# Patient Record
Sex: FEMALE | Race: WHITE | NOT HISPANIC OR LATINO | Employment: UNEMPLOYED
[De-identification: names, ages, dates, MRNs, and addresses within clinical notes are randomized per-mention and may not be internally consistent; named-entity substitution may affect disease eponyms.]

---
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
## 2020-08-18 LAB
ABO + RH BLD: NORMAL
ABO + RH BLD: NORMAL
BLD GP AB SCN SERPL QL: NORMAL
HCT VFR BLD AUTO: 43.1 %
HEMOGLOBIN: 14.2 G/DL (ref 11.7–15.7)
HIV 1+2 AB+HIV1 P24 AG SERPL QL IA: NORMAL
RUBELLA ANTIBODY IGG QUANTITATIVE: NORMAL IU/ML

## 2020-09-11 ENCOUNTER — TRANSFERRED RECORDS (OUTPATIENT)
Dept: HEALTH INFORMATION MANAGEMENT | Facility: CLINIC | Age: 20
End: 2020-09-11

## 2020-09-16 ENCOUNTER — TRANSFERRED RECORDS (OUTPATIENT)
Dept: HEALTH INFORMATION MANAGEMENT | Facility: CLINIC | Age: 20
End: 2020-09-16

## 2021-01-19 ENCOUNTER — PRENATAL OFFICE VISIT (OUTPATIENT)
Dept: NURSING | Facility: CLINIC | Age: 21
End: 2021-01-19
Payer: MEDICAID

## 2021-01-19 DIAGNOSIS — Z34.80 PRENATAL CARE, SUBSEQUENT PREGNANCY, UNSPECIFIED TRIMESTER: Primary | ICD-10-CM

## 2021-01-19 PROCEDURE — 99207 PR NO CHARGE NURSE ONLY: CPT

## 2021-01-19 RX ORDER — VITAMIN A ACETATE, .BETA.-CAROTENE, ASCORBIC ACID, CHOLECALCIFEROL, .ALPHA.-TOCOPHEROL ACETATE, DL-, THIAMINE MONONITRATE, RIBOFLAVIN, NIACINAMIDE, PYRIDOXINE HYDROCHLORIDE, FOLIC ACID, CYANOCOBALAMIN, CALCIUM CARBONATE, FERROUS FUMARATE, ZINC OXIDE, AND CUPRIC OXIDE 2000; 2000; 120; 400; 22; 1.84; 3; 20; 10; 1; 12; 200; 27; 25; 2 [IU]/1; [IU]/1; MG/1; [IU]/1; MG/1; MG/1; MG/1; MG/1; MG/1; MG/1; UG/1; MG/1; MG/1; MG/1; MG/1
1 TABLET ORAL DAILY
COMMUNITY
End: 2021-05-11

## 2021-01-19 NOTE — PROGRESS NOTES
NPN nurse visit done over the phone. Pt will be given NPN folder and book at her upcoming appt.   Discussed optional screening available to assess chromosomal anomalies. Questions answered. Pt advised to call the clinic if she has any questions or concerns related to her pregnancy. Prenatal labs will be obtained at her upcoming appt. New prenatal visit scheduled on 1/22/21 with Dr Hughes.    30w2d    No results found for: PAP- last pap during last preg per pt, abnml.  No record on this        Patient supplied answers from flow sheet for:  Prenatal OB Questionnaire.  Past Medical History  Diabetes?: No  Hypertension : No  Heart disease, mitral valve prolapse or rheumatic fever?: (!) Yes(bradycardia)  An autoimmune disease such as lupus or rheumatoid arthritis?: No  Kidney disease or urinary tract infection?: No  Epilepsy, seizures or spells?: No  Migraine headaches?: No  A stroke or loss of function or sensation?: No  Any other neurological problems?: No  Have you ever been treated for depression?: (!) Yes  Are you having problems with crying spells or loss of self-esteem?: (!) Yes(a little, will discuss at appt)  Have you ever required psychiatric care?: No  Have you ever had hepatitis, liver disease or jaundice?: No  Have you been treated for blood clots in your veins, deep vein thromosis, inflammation in the veins, thrombosis, phlebitis, pulmonary embolism or varicosities?: No  Have you had excessive bleeding after surgery or dental work?: No  Do you bleed more than other women after a cut or scratch?: No  Do you have a history of anemia?: No  Have you ever had thyroid problems or taken thyroid medication?: No   Do you have any endocrine problems?: No  Have you ever been in a major accident or suffered serious trauma?: No  Within the last year, has anyone hit, slapped, kicked or otherwise hurt you?: No  In the last year, has anyone forced you to have sex when you didn't want to?: No    Past Medical History 2   Have  you ever received a blood transfusion?: No  Would you refuse a blood transfusion if a doctor judged it to be medically necessary?: No   If you answered Yes, would you rather die than receive a blood transfusion?: No  If you answered Yes, is this for Restorationist reasons?: No  Does anyone in your home smoke?: No  Do you use tobacco products?: No  Do you drink beer, wine or hard liquor?: No  Do you use any of the following: marijuana, speed, cocaine, heroin, hallucinogens or other drugs?: No   Is your blood type Rh negative?: No  Have you ever had abnormal antibodies in your blood?: No  Have you ever had asthma?: (!) Yes(as a child)  Have you ever had tuberculosis?: No  Do you have any allergies to drugs or over-the-counter medications?: No  Allergies: Dust Mites, Aspartame, Ethanol, Venlafaxine, Hydrochloride, Sertraline: (!) Yes  Have you had any breast problems?: No  Have you ever ?: (!) Yes  Have you had any gynecological surgical procedures such as cervical conization, a LEEP procedure, laser treatment, cryosurgery of the cervix or a dilation and curettage, etc?: No  Have you ever had any other surgical procedures?: No  Have you been hospitalized for a nonsurgical reason excluding normal delivery?: No  Have you ever had any anesthetic complications?: No  Have you ever had an abnormal pap smear?: (!) Yes(during pregnancy)    Past Medical History (Continued)  Do you have a history of abnormalities of the uterus?: No  Did your mother take MARIS or any other hormones when she was pregnant with you?: No  Did it take you more than a year to become pregnant?: No  Have you ever been evaluated or treated for infertility?: No  Is there a history of medical problems in your family, which you feel may be important to this pregnancy?: No  Do you have any other problems we have not asked about which you feel may be important to this pregnancy?: No    Symptoms since last menstrual period  Do you have any of the following  symptoms: abdominal pain, blood in stools or urine, chest pain, shortness of breath, coughing or vomiting up blood, your heart racing or skipping beats, nausea and vomiting, pain on urination or vaginal discharge or bleed: No  Will the patient be 35 years old or older at the time of delivery?: No    Has the patient, baby's father or anyone in either family had:  Thalassemia (Italian, Greek, Mediterranean or  background only) and an MCV result less than 80?: No  Neural tube defect such as meningomyelocele, spina bifida or anencephaly?: No  Congenital heart defect?: No  Down's Syndrome?: No  José Miguel-Sachs disease (Gnosticist, Cajun, Bel[place]ian-[place])?: No  Sickle cell disease or trait ()?: No  Hemophilia or other inherited problems of blood?: No  Muscular dystrophy?: No  Cystic fibrosis?: No  Wheaton's chorea?: No  Mental retardation/autism?: No  If yes, was the person tested for fragile X?: No  Any other inherited genetic or chromosomal disorder?: No  Maternal metabolic disorder (e.g Insulin-dependent diabetes, PKU)?: No  A child with birth defects not listed above?: No  Recurrent pregnancy loss or stillbirth?: No   Has the patient had any medications/street drugs/alcohol since her last menstrual period?: No  Does the patient or baby's father have any other genetic risks?: No    Infection History   Do you object to being tested for Hepatitis B?: No  Do you object to being tested for HIV?: No   Do you feel that you are at high risk for coming in contact with the AIDS virus?: No  Have you ever been treated for tuberculosis?: No  Have you ever had a positive skin test for tuberculosis?: No  Do you live with someone who has tuberculosis?: No  Have you ever been exposed to tuberculosis?: No  Do you have genital herpes?: No  Does your partner have genital herpes?: No  Have you had a viral illness since your last period?: No  Have you ever had gonorrhea, chlamydia, syphilis, venereal warts, trichomoniasis, pelvic  inflammatory disease or any other sexually transmitted disease?: No  Do you know if you are a genital group B streptococcus carrier?: No  Have you had chicken pox/varicella?: No   Have you been vaccinated against chicken Pox?: (!) Yes  Have you had any other infectious diseases?: No

## 2021-01-20 DIAGNOSIS — Z34.80 PRENATAL CARE, SUBSEQUENT PREGNANCY, UNSPECIFIED TRIMESTER: ICD-10-CM

## 2021-01-20 LAB
ERYTHROCYTE [DISTWIDTH] IN BLOOD BY AUTOMATED COUNT: 12.6 % (ref 10–15)
GLUCOSE 1H P 50 G GLC PO SERPL-MCNC: 118 MG/DL (ref 60–129)
HBV SURFACE AG SERPL QL IA: NONREACTIVE
HCT VFR BLD AUTO: 32.5 % (ref 35–47)
HGB BLD-MCNC: 10.9 G/DL (ref 11.7–15.7)
MCH RBC QN AUTO: 31.7 PG (ref 26.5–33)
MCHC RBC AUTO-ENTMCNC: 33.5 G/DL (ref 31.5–36.5)
MCV RBC AUTO: 95 FL (ref 78–100)
PLATELET # BLD AUTO: 222 10E9/L (ref 150–450)
RBC # BLD AUTO: 3.44 10E12/L (ref 3.8–5.2)
T PALLIDUM AB SER QL: NONREACTIVE
WBC # BLD AUTO: 11.3 10E9/L (ref 4–11)

## 2021-01-20 PROCEDURE — 99000 SPECIMEN HANDLING OFFICE-LAB: CPT | Performed by: OBSTETRICS & GYNECOLOGY

## 2021-01-20 PROCEDURE — 87086 URINE CULTURE/COLONY COUNT: CPT | Performed by: OBSTETRICS & GYNECOLOGY

## 2021-01-20 PROCEDURE — 76805 OB US >/= 14 WKS SNGL FETUS: CPT | Performed by: OBSTETRICS & GYNECOLOGY

## 2021-01-20 PROCEDURE — 36415 COLL VENOUS BLD VENIPUNCTURE: CPT | Performed by: OBSTETRICS & GYNECOLOGY

## 2021-01-20 PROCEDURE — 82950 GLUCOSE TEST: CPT | Performed by: OBSTETRICS & GYNECOLOGY

## 2021-01-20 PROCEDURE — 86780 TREPONEMA PALLIDUM: CPT | Mod: 90 | Performed by: OBSTETRICS & GYNECOLOGY

## 2021-01-20 PROCEDURE — 87340 HEPATITIS B SURFACE AG IA: CPT | Performed by: OBSTETRICS & GYNECOLOGY

## 2021-01-20 PROCEDURE — 85027 COMPLETE CBC AUTOMATED: CPT | Performed by: OBSTETRICS & GYNECOLOGY

## 2021-01-21 LAB
BACTERIA SPEC CULT: NO GROWTH
Lab: NORMAL
SPECIMEN SOURCE: NORMAL

## 2021-01-22 ENCOUNTER — PRENATAL OFFICE VISIT (OUTPATIENT)
Dept: OBGYN | Facility: CLINIC | Age: 21
End: 2021-01-22
Payer: MEDICAID

## 2021-01-22 VITALS — DIASTOLIC BLOOD PRESSURE: 76 MMHG | WEIGHT: 166 LBS | SYSTOLIC BLOOD PRESSURE: 122 MMHG

## 2021-01-22 DIAGNOSIS — Z23 NEED FOR PROPHYLACTIC VACCINATION AND INOCULATION AGAINST INFLUENZA: ICD-10-CM

## 2021-01-22 DIAGNOSIS — F41.1 GAD (GENERALIZED ANXIETY DISORDER): ICD-10-CM

## 2021-01-22 DIAGNOSIS — O09.33 INSUFFICIENT PRENATAL CARE IN THIRD TRIMESTER: Primary | ICD-10-CM

## 2021-01-22 PROCEDURE — 99202 OFFICE O/P NEW SF 15 MIN: CPT | Mod: 25 | Performed by: OBSTETRICS & GYNECOLOGY

## 2021-01-22 PROCEDURE — 90715 TDAP VACCINE 7 YRS/> IM: CPT | Performed by: OBSTETRICS & GYNECOLOGY

## 2021-01-22 PROCEDURE — 90472 IMMUNIZATION ADMIN EACH ADD: CPT | Performed by: OBSTETRICS & GYNECOLOGY

## 2021-01-22 PROCEDURE — 90686 IIV4 VACC NO PRSV 0.5 ML IM: CPT | Performed by: OBSTETRICS & GYNECOLOGY

## 2021-01-22 PROCEDURE — 90471 IMMUNIZATION ADMIN: CPT | Performed by: OBSTETRICS & GYNECOLOGY

## 2021-01-22 RX ORDER — ESCITALOPRAM OXALATE 5 MG/1
5 TABLET ORAL DAILY
Qty: 90 TABLET | Refills: 3 | Status: SHIPPED | OUTPATIENT
Start: 2021-01-22 | End: 2021-02-01

## 2021-01-22 SDOH — HEALTH STABILITY: MENTAL HEALTH: HOW OFTEN DO YOU HAVE A DRINK CONTAINING ALCOHOL?: NEVER

## 2021-01-22 NOTE — PROGRESS NOTES
Pt is a [place] from [place] where she had limited prenatal care, with the last OB visit at 13+ weeks.  She then had to move to [place] and has not had OB care since.  Her OB records are at the [place] office (we're in [place] right now).  Will review those when able.  Had labs done in [place].  Also had 28 week labs done here 2 days ago - WNL.  TDaP today.  U/S done today for anatomy however report pending, and my review of images suggests several views insufficient to fully evaluate due to advanced gestational age.  Will get Level 2 U/S as precaution.  Flu shot today.  TDaP today. Fetal movement counts BID,  labor/premature rupture of membranes precautions reviewed.  RTC 2 week(s).    Encounter Diagnoses   Name Primary?     Insufficient prenatal care in third trimester Yes     JASSON (generalized anxiety disorder)        Risk factors listed above are stable and being addressed as noted.    Santy Hughes MD  [facility]

## 2021-01-22 NOTE — NURSING NOTE
"Chief Complaint   Patient presents with     Prenatal Care     30 weeks 5 days- no concerns        Initial /76 (BP Location: Right arm, Patient Position: Sitting, Cuff Size: Adult Regular)   Wt 75.3 kg (166 lb)   LMP 2020  Estimated body mass index is 21.88 kg/m  as calculated from the following:    Height as of 1/10/08: 1.403 m (4' 7.25\").    Weight as of 1/10/08: 43.1 kg (95 lb).  BP completed using cuff size: regular    Questioned patient about current smoking habits.  Pt. has never smoked.          The following HM Due: NONE    30w5d  Jacky Cota CMA                "

## 2021-01-24 ENCOUNTER — HEALTH MAINTENANCE LETTER (OUTPATIENT)
Age: 21
End: 2021-01-24

## 2021-01-25 ENCOUNTER — TRANSCRIBE ORDERS (OUTPATIENT)
Dept: MATERNAL FETAL MEDICINE | Facility: CLINIC | Age: 21
End: 2021-01-25

## 2021-01-25 ENCOUNTER — PRE VISIT (OUTPATIENT)
Dept: MATERNAL FETAL MEDICINE | Facility: CLINIC | Age: 21
End: 2021-01-25

## 2021-01-25 DIAGNOSIS — O26.90 PREGNANCY RELATED CONDITION, ANTEPARTUM: Primary | ICD-10-CM

## 2021-01-28 ENCOUNTER — OFFICE VISIT (OUTPATIENT)
Dept: MATERNAL FETAL MEDICINE | Facility: CLINIC | Age: 21
End: 2021-01-28
Attending: OBSTETRICS & GYNECOLOGY
Payer: MEDICAID

## 2021-01-28 ENCOUNTER — HOSPITAL ENCOUNTER (OUTPATIENT)
Dept: ULTRASOUND IMAGING | Facility: CLINIC | Age: 21
End: 2021-01-28
Attending: OBSTETRICS & GYNECOLOGY
Payer: MEDICAID

## 2021-01-28 DIAGNOSIS — Z36.3 ENCOUNTER FOR ROUTINE SCREENING FOR MALFORMATION USING ULTRASOUND: Primary | ICD-10-CM

## 2021-01-28 DIAGNOSIS — O26.90 PREGNANCY RELATED CONDITION, ANTEPARTUM: ICD-10-CM

## 2021-01-28 PROCEDURE — 76811 OB US DETAILED SNGL FETUS: CPT

## 2021-01-28 PROCEDURE — 76811 OB US DETAILED SNGL FETUS: CPT | Mod: 26 | Performed by: OBSTETRICS & GYNECOLOGY

## 2021-01-28 NOTE — PROGRESS NOTES
Dominique June was seen for an ultrasound today at the [facility] center.      For the details of the ultrasound please see the report which can be found under the imaging tab.      Melba Rivera MD  , OB/GYN  Maternal-Fetal Medicine  [email or web address]  [phone] (Main M Office)  [phone] or [phone] (for 24 hour MFM questions)  [phone] (Pager)

## 2021-02-01 ENCOUNTER — PRENATAL OFFICE VISIT (OUTPATIENT)
Dept: OBGYN | Facility: CLINIC | Age: 21
End: 2021-02-01
Payer: MEDICAID

## 2021-02-01 VITALS — WEIGHT: 167 LBS | SYSTOLIC BLOOD PRESSURE: 132 MMHG | DIASTOLIC BLOOD PRESSURE: 72 MMHG

## 2021-02-01 DIAGNOSIS — O09.33 INSUFFICIENT PRENATAL CARE IN THIRD TRIMESTER: Primary | ICD-10-CM

## 2021-02-01 PROCEDURE — 99212 OFFICE O/P EST SF 10 MIN: CPT | Performed by: OBSTETRICS & GYNECOLOGY

## 2021-02-01 NOTE — NURSING NOTE
"Chief Complaint   Patient presents with     Prenatal Care     32 weeks 1 day- heartburn        Initial /72 (BP Location: Right arm, Patient Position: Sitting, Cuff Size: Adult Regular)   Wt 75.8 kg (167 lb)   LMP 2020  Estimated body mass index is 21.88 kg/m  as calculated from the following:    Height as of 1/10/08: 1.403 m (4' 7.25\").    Weight as of 1/10/08: 43.1 kg (95 lb).  BP completed using cuff size: regular    Questioned patient about current smoking habits.  Pt. has never smoked.          The following HM Due: NONE    32w1d  Jacky Cota CMA                "

## 2021-02-01 NOTE — PROGRESS NOTES
No c/o's.  Start extra Fe due to COVID-related blood transfusion.  Fetal movement counts BID,  labor/premature rupture of membranes precautions reviewed.  RTC 2 week(s).    Encounter Diagnosis   Name Primary?     Insufficient prenatal care in third trimester Yes       Risk factors listed above are stable and being addressed as noted.    Santy Hughes MD  [facility] [place]

## 2021-02-15 ENCOUNTER — PRENATAL OFFICE VISIT (OUTPATIENT)
Dept: OBGYN | Facility: CLINIC | Age: 21
End: 2021-02-15
Payer: MEDICAID

## 2021-02-15 VITALS — SYSTOLIC BLOOD PRESSURE: 132 MMHG | WEIGHT: 169 LBS | DIASTOLIC BLOOD PRESSURE: 78 MMHG

## 2021-02-15 DIAGNOSIS — O09.33 INSUFFICIENT PRENATAL CARE IN THIRD TRIMESTER: Primary | ICD-10-CM

## 2021-02-15 DIAGNOSIS — B35.4 TINEA CORPORIS: ICD-10-CM

## 2021-02-15 DIAGNOSIS — N76.89 OTHER SPECIFIED INFLAMMATION OF VAGINA AND VULVA: ICD-10-CM

## 2021-02-15 LAB
SPECIMEN SOURCE: NORMAL
WET PREP SPEC: NORMAL

## 2021-02-15 PROCEDURE — 99207 PR PRENATAL VISIT: CPT | Performed by: OBSTETRICS & GYNECOLOGY

## 2021-02-15 PROCEDURE — 87210 SMEAR WET MOUNT SALINE/INK: CPT | Performed by: OBSTETRICS & GYNECOLOGY

## 2021-02-15 PROCEDURE — 99213 OFFICE O/P EST LOW 20 MIN: CPT | Performed by: OBSTETRICS & GYNECOLOGY

## 2021-02-15 PROCEDURE — 87491 CHLMYD TRACH DNA AMP PROBE: CPT | Performed by: OBSTETRICS & GYNECOLOGY

## 2021-02-15 PROCEDURE — 87591 N.GONORRHOEAE DNA AMP PROB: CPT | Performed by: OBSTETRICS & GYNECOLOGY

## 2021-02-15 RX ORDER — CLOTRIMAZOLE AND BETAMETHASONE DIPROPIONATE 10; .64 MG/G; MG/G
CREAM TOPICAL 2 TIMES DAILY
Qty: 15 G | Refills: 1 | Status: SHIPPED | OUTPATIENT
Start: 2021-02-15 | End: 2021-03-15

## 2021-02-15 RX ORDER — FLUCONAZOLE 150 MG/1
TABLET ORAL
Qty: 2 TABLET | Refills: 0 | Status: SHIPPED | OUTPATIENT
Start: 2021-02-15 | End: 2021-03-15

## 2021-02-15 NOTE — PROGRESS NOTES
Pt has right inguinal crease between perineum and inner thigh rash, itching x 3 weeks, no improvement w/ Desitin oint.  No OB c/o's.  Fetus active, no VB, SROM, UCs.  Exam- 6 x 3 cm area erythematous patch in the above noted skin fold c/w tinea, Vagina has white discharge also, Wet prep done.  GC/Chlam done.  Rx Diflucan 150 mg, Lotrisone ointment.  GBS at next visit.  Fetal movement counts BID,  labor/premature rupture of membranes precautions reviewed.  RTC 2 week(s).    Encounter Diagnoses   Name Primary?     Insufficient prenatal care in third trimester Yes     Other specified inflammation of vagina and vulva      Tinea corporis        Risk factors listed above are stable and being addressed as noted.    Santy Hughes MD  [facility] [place]

## 2021-02-15 NOTE — NURSING NOTE
"Chief Complaint   Patient presents with     Prenatal Care     34 weeks 1 day- vaginal rash        Initial /78 (BP Location: Right arm, Patient Position: Sitting, Cuff Size: Adult Large)   Wt 76.7 kg (169 lb)   LMP 2020  Estimated body mass index is 21.88 kg/m  as calculated from the following:    Height as of 1/10/08: 1.403 m (4' 7.25\").    Weight as of 1/10/08: 43.1 kg (95 lb).  BP completed using cuff size: large    Questioned patient about current smoking habits.  Pt. has never smoked.          The following HM Due: NONE    34w1d  Jacky Cota CMA                "

## 2021-02-16 LAB
C TRACH DNA SPEC QL NAA+PROBE: NEGATIVE
N GONORRHOEA DNA SPEC QL NAA+PROBE: NEGATIVE
SPECIMEN SOURCE: NORMAL
SPECIMEN SOURCE: NORMAL

## 2021-03-03 ENCOUNTER — PRENATAL OFFICE VISIT (OUTPATIENT)
Dept: OBGYN | Facility: CLINIC | Age: 21
End: 2021-03-03
Payer: COMMERCIAL

## 2021-03-03 VITALS — WEIGHT: 173 LBS | SYSTOLIC BLOOD PRESSURE: 120 MMHG | DIASTOLIC BLOOD PRESSURE: 60 MMHG

## 2021-03-03 DIAGNOSIS — O09.33 INSUFFICIENT PRENATAL CARE IN THIRD TRIMESTER: Primary | ICD-10-CM

## 2021-03-03 DIAGNOSIS — N89.8 VAGINAL DISCHARGE: ICD-10-CM

## 2021-03-03 DIAGNOSIS — O36.5930 POOR FETAL GROWTH AFFECTING MANAGEMENT OF MOTHER IN THIRD TRIMESTER, SINGLE OR UNSPECIFIED FETUS: ICD-10-CM

## 2021-03-03 LAB — FERN CRYSTALLIZATION: NORMAL

## 2021-03-03 PROCEDURE — 99207 PR COMPLICATED OB VISIT: CPT | Performed by: OBSTETRICS & GYNECOLOGY

## 2021-03-03 PROCEDURE — 87653 STREP B DNA AMP PROBE: CPT | Performed by: OBSTETRICS & GYNECOLOGY

## 2021-03-03 NOTE — NURSING NOTE
"Chief Complaint   Patient presents with     Prenatal Care     36 weeks 3 days- concerns of leaking fluid, back pain/cramping        Initial /60 (BP Location: Right arm, Patient Position: Sitting, Cuff Size: Adult Regular)   Wt 78.5 kg (173 lb)   LMP 2020  Estimated body mass index is 21.88 kg/m  as calculated from the following:    Height as of 1/10/08: 1.403 m (4' 7.25\").    Weight as of 1/10/08: 43.1 kg (95 lb).  BP completed using cuff size: regular    Questioned patient about current smoking habits.  Pt. has never smoked.          The following HM Due: NONE    36w3d  Jacky Cota CMA                "

## 2021-03-03 NOTE — PROGRESS NOTES
Pt has some wetness on perineum for 2 days.  SSE - Pool Neg, Fern Neg.  GBS done.  Cx-0/30/-5/Vtx.  S<D, will get U/S for EFW, AFV.  Fetal movement counts BID,  labor/premature rupture of membranes precautions reviewed.  RTC 1 week(s).    Encounter Diagnoses   Name Primary?     Insufficient prenatal care in third trimester Yes     Vaginal discharge      Poor fetal growth affecting management of mother in third trimester, single or unspecified fetus        Risk factors listed above are stable and being addressed as noted.    Santy Hughes MD  [facility] [place]

## 2021-03-05 LAB
GP B STREP DNA SPEC QL NAA+PROBE: NEGATIVE
SPECIMEN SOURCE: NORMAL

## 2021-03-08 ENCOUNTER — ANCILLARY PROCEDURE (OUTPATIENT)
Dept: ULTRASOUND IMAGING | Facility: CLINIC | Age: 21
End: 2021-03-08
Attending: OBSTETRICS & GYNECOLOGY
Payer: COMMERCIAL

## 2021-03-08 ENCOUNTER — PRENATAL OFFICE VISIT (OUTPATIENT)
Dept: OBGYN | Facility: CLINIC | Age: 21
End: 2021-03-08
Payer: COMMERCIAL

## 2021-03-08 VITALS — SYSTOLIC BLOOD PRESSURE: 126 MMHG | DIASTOLIC BLOOD PRESSURE: 62 MMHG | WEIGHT: 176 LBS

## 2021-03-08 DIAGNOSIS — O09.33 INSUFFICIENT PRENATAL CARE IN THIRD TRIMESTER: ICD-10-CM

## 2021-03-08 DIAGNOSIS — N89.8 VAGINAL DISCHARGE: ICD-10-CM

## 2021-03-08 DIAGNOSIS — O36.5930 POOR FETAL GROWTH AFFECTING MANAGEMENT OF MOTHER IN THIRD TRIMESTER, SINGLE OR UNSPECIFIED FETUS: ICD-10-CM

## 2021-03-08 DIAGNOSIS — O09.33 INSUFFICIENT PRENATAL CARE IN THIRD TRIMESTER: Primary | ICD-10-CM

## 2021-03-08 PROCEDURE — 76816 OB US FOLLOW-UP PER FETUS: CPT | Performed by: OBSTETRICS & GYNECOLOGY

## 2021-03-08 PROCEDURE — 99207 PR COMPLICATED OB VISIT: CPT | Performed by: OBSTETRICS & GYNECOLOGY

## 2021-03-08 PROCEDURE — 76819 FETAL BIOPHYS PROFIL W/O NST: CPT | Performed by: OBSTETRICS & GYNECOLOGY

## 2021-03-08 NOTE — PROGRESS NOTES
"No c/o's.  Fetus active.  No VB, SROM, UCs.  Had U/S today showing 3049 g (49%), AFV WNL, Vtx, however FHTs ranged 165-218 with most in the 190s, BPP-8/8.  FHTs here in the office 137 and regular.  Pt does state that during the U/S, her baby was very active and moving a lot.  This suggests the U/S-measured \"tachycardia\" represented prolonged accel.  Cx-1/50/-3/Vtx.  Fetal movement counts BID, rupture of membranes/labor precautions reviewed.  RTC 1 week(s).    Encounter Diagnosis   Name Primary?     Insufficient prenatal care in third trimester Yes       Risk factors listed above are stable and being addressed as noted.    Santy Hughes MD  [facility] [place]    "

## 2021-03-08 NOTE — NURSING NOTE
"Chief Complaint   Patient presents with     Prenatal Care     37 weeks 1 day- no concerns        Initial /62 (BP Location: Right arm, Patient Position: Sitting, Cuff Size: Adult Regular)   Wt 79.8 kg (176 lb)   LMP 2020  Estimated body mass index is 21.88 kg/m  as calculated from the following:    Height as of 1/10/08: 1.403 m (4' 7.25\").    Weight as of 1/10/08: 43.1 kg (95 lb).  BP completed using cuff size: large    Questioned patient about current smoking habits.  Pt. has never smoked.          The following HM Due: NONE    37w1d  Jacky Cota CMA                "

## 2021-03-13 ENCOUNTER — NURSE TRIAGE (OUTPATIENT)
Dept: NURSING | Facility: CLINIC | Age: 21
End: 2021-03-13

## 2021-03-13 ENCOUNTER — HOSPITAL ENCOUNTER (OUTPATIENT)
Facility: CLINIC | Age: 21
Discharge: HOME | End: 2021-03-13
Attending: OBSTETRICS & GYNECOLOGY | Admitting: OBSTETRICS & GYNECOLOGY
Payer: COMMERCIAL

## 2021-03-13 VITALS — RESPIRATION RATE: 16 BRPM | TEMPERATURE: 98.2 F

## 2021-03-13 LAB
ALBUMIN UR-MCNC: NEGATIVE MG/DL
APPEARANCE UR: ABNORMAL
BACTERIA #/AREA URNS HPF: ABNORMAL /HPF
BILIRUB UR QL STRIP: NEGATIVE
COLOR UR AUTO: YELLOW
GLUCOSE UR STRIP-MCNC: NEGATIVE MG/DL
HGB UR QL STRIP: NEGATIVE
KETONES UR STRIP-MCNC: NEGATIVE MG/DL
LEUKOCYTE ESTERASE UR QL STRIP: ABNORMAL
MUCOUS THREADS #/AREA URNS LPF: PRESENT /LPF
NITRATE UR QL: NEGATIVE
PH UR STRIP: 7 PH (ref 5–7)
RBC #/AREA URNS AUTO: 1 /HPF (ref 0–2)
SOURCE: ABNORMAL
SP GR UR STRIP: 1.02 (ref 1–1.03)
SQUAMOUS #/AREA URNS AUTO: 4 /HPF (ref 0–1)
UROBILINOGEN UR STRIP-MCNC: NORMAL MG/DL (ref 0–2)
WBC #/AREA URNS AUTO: 10 /HPF (ref 0–5)

## 2021-03-13 PROCEDURE — 81001 URINALYSIS AUTO W/SCOPE: CPT | Performed by: OBSTETRICS & GYNECOLOGY

## 2021-03-13 PROCEDURE — G0463 HOSPITAL OUTPT CLINIC VISIT: HCPCS

## 2021-03-13 PROCEDURE — 87086 URINE CULTURE/COLONY COUNT: CPT | Performed by: OBSTETRICS & GYNECOLOGY

## 2021-03-13 NOTE — PLAN OF CARE
Data: [facility] to [facility]: 3/13/2021 12:12 PM.  Reason for maternal/fetal assessment is uterine contractions. Patient reports uterine contractions since 0200 that have become more frequent. Patient states they are not very uncomfortable yet, but since they were getting closer together she thought she should be assessed.  Patient is a .  Prenatal record reviewed. Pregnancy has been uncomplicated..  Gestational Age 37w6d. VSS. Fetal movement present. Patient denies leaking of vaginal fluid/rupture of membranes, vaginal bleeding, pelvic pressure, nausea, vomiting, headache, visual disturbances, epigastric or URQ pain, significant edema. Support person is present.   Action: Verbal consent for EFM. Triage assessment completed. UA collected and sent. SVE /3, posterior, soft. Bill of rights reviewed.  Response: Patient verbalized agreement with plan. Will contact Dr Judith Castorena with update and further orders.

## 2021-03-13 NOTE — PLAN OF CARE
Spoke with MD regarding cervical exam, UA WNL, report of frequent contractions but not necessarily painful. FHT's category 1.    Patient given the option to stay, walk and re-check cervix or discharge to home. Patient opting to discharge to home, educated regarding signs and symptoms of progressing labor and when to call the doctor, states understanding.

## 2021-03-13 NOTE — DISCHARGE INSTRUCTIONS
Discharge Instruction for Undelivered Patients      You were seen for: Labor Assessment  We Consulted: Dr. Castorena  You had (Test or Medicine):Fetal and uterine monitoring, cervical exam     Diet:   Drink 8 to 12 glasses of liquids (milk, juice, water) every day.  You may eat meals and snacks.     Activity:  Count fetal kicks everyday (see handout)  Call your doctor or nurse midwife if your baby is moving less than usual.     Call your provider if you notice:  Swelling in your face or increased swelling in your hands or legs.  Headaches that are not relieved by Tylenol (acetaminophen).  Changes in your vision (blurring: seeing spots or stars.)  Nausea (sick to your stomach) and vomiting (throwing up).   Weight gain of 5 pounds or more per week.  Heartburn that doesn't go away.  Signs of bladder infection: pain when you urinate (use the toilet), need to go more often and more urgently.  The bag of cronin (rupture of membranes) breaks, or you notice leaking in your underwear.  Bright red blood in your underwear.  Abdominal (lower belly) or stomach pain.  Increase or change in vaginal discharge (note the color and amount)    **Call your clinic and return to the hospital if you notice painful contractions that take all your focus, 6 times or more in an hour.      Follow-up:  As scheduled in the clinic

## 2021-03-13 NOTE — TELEPHONE ENCOUNTER
Has been having contractions since 3 a.m. this morning.  Contractions were 4-5 minutes minutes apart, then, but are now 3-4 minutes apart.    Water not broken.  Baby active.    Per protocol for this group, I advised Dominique to go to L & D at [facility].  I called L & D at [facility] and spoke to the charge nurse. Gave above details and that I'd instructed patient to go to L& D.  Patient and spouse will drop their toddler off at grandparent's home. It'll be about an hour before they arrive at the hospital.      COVID 19 Nurse Triage Plan/Patient Instructions    Please be aware that novel coronavirus (COVID-19) may be circulating in the community. If you develop symptoms such as fever, cough, or SOB or if you have concerns about the presence of another infection including coronavirus (COVID-19), please contact your health care provider or visit [email or web address].     Disposition/Instructions    L & D Visit recommended. Follow protocol based instructions.     Bring Your Own Device:  Please also bring your smart device(s) (smart phones, tablets, laptops) and their charging cables for your personal use and to communicate with your care team during your visit.    Thank you for taking steps to prevent the spread of this virus.  o Limit your contact with others.  o Wear a simple mask to cover your cough.  o Wash your hands well and often.    Resources    [facility]: About COVID-19: [email or web address]    CDC: What to Do If You're Sick: www.cdc.gov/coronavirus/2019-ncov/about/steps-when-sick.html    CDC: Ending Home Isolation: www.cdc.gov/coronavirus/2019-ncov/hcp/disposition-in-home-patients.html     CDC: Caring for Someone: www.cdc.gov/coronavirus/2019-ncov/if-you-are-sick/care-for-someone.html     [facility]: Interim Guidance for Hospital Discharge to Home: www.health.[facility].[place]/diseases/coronavirus/hcp/hospdischarge.pdf    [facility] clinical trials (COVID-19 research studies):  [email or web address]-clinical-trials     Below are the COVID-19 hotlines at the [place] Department of [employer] ([facility]). Interpreters are available.   o For health questions: Call 886-322-9924 or 1-437.493.7936 (7 a.m. to 7 p.m.)  o For questions about schools and childcare: Call 993-684-2115 or 1-949.770.2271 (7 a.m. to 7 p.m.)      Reason for Disposition    [1] History of prior delivery (multipara) AND [2] contractions < 10 minutes apart AND [3] present 1 hour    Protocols used: PREGNANCY - LABOR-A-AH    Suzette HEATH RN [facility]

## 2021-03-14 LAB
BACTERIA SPEC CULT: NORMAL
Lab: NORMAL
SPECIMEN SOURCE: NORMAL

## 2021-03-15 ENCOUNTER — PRENATAL OFFICE VISIT (OUTPATIENT)
Dept: OBGYN | Facility: CLINIC | Age: 21
End: 2021-03-15
Payer: COMMERCIAL

## 2021-03-15 VITALS — SYSTOLIC BLOOD PRESSURE: 122 MMHG | WEIGHT: 176 LBS | DIASTOLIC BLOOD PRESSURE: 78 MMHG

## 2021-03-15 DIAGNOSIS — O09.33 INSUFFICIENT PRENATAL CARE IN THIRD TRIMESTER: Primary | ICD-10-CM

## 2021-03-15 PROCEDURE — 99207 PR COMPLICATED OB VISIT: CPT | Performed by: OBSTETRICS & GYNECOLOGY

## 2021-03-15 NOTE — NURSING NOTE
"Chief Complaint   Patient presents with     Prenatal Care     38 weeks 1 day- no concerns        Initial /78 (BP Location: Right arm, Patient Position: Sitting, Cuff Size: Adult Regular)   Wt 79.8 kg (176 lb)   LMP 2020  Estimated body mass index is 21.88 kg/m  as calculated from the following:    Height as of 1/10/08: 1.403 m (4' 7.25\").    Weight as of 1/10/08: 43.1 kg (95 lb).  BP completed using cuff size: regular    Questioned patient about current smoking habits.  Pt. has never smoked.          The following HM Due: NONE    38w1d  Jacky Cota CMA                "

## 2021-03-15 NOTE — PROGRESS NOTES
Pt had some UC's, was in L&D 2 days ago, still 1 cm.  Had membranes stripped.  Fetus active, no VB, SROM.  Cx-1/50/-3/Vtx.  Fetal movement counts BID, rupture of membranes/labor precautions reviewed.  RTC 1 week(s).    Encounter Diagnosis   Name Primary?     Insufficient prenatal care in third trimester Yes       Risk factors listed above are stable and being addressed as noted.    Santy Hughes MD  [facility] [place]

## 2021-03-22 ENCOUNTER — PRENATAL OFFICE VISIT (OUTPATIENT)
Dept: OBGYN | Facility: CLINIC | Age: 21
End: 2021-03-22
Payer: COMMERCIAL

## 2021-03-22 VITALS — WEIGHT: 179 LBS | SYSTOLIC BLOOD PRESSURE: 118 MMHG | DIASTOLIC BLOOD PRESSURE: 78 MMHG

## 2021-03-22 DIAGNOSIS — O09.33 INSUFFICIENT PRENATAL CARE IN THIRD TRIMESTER: Primary | ICD-10-CM

## 2021-03-22 PROCEDURE — 99207 PR COMPLICATED OB VISIT: CPT | Performed by: OBSTETRICS & GYNECOLOGY

## 2021-03-22 PROCEDURE — 59425 ANTEPARTUM CARE ONLY: CPT | Performed by: OBSTETRICS & GYNECOLOGY

## 2021-03-22 NOTE — NURSING NOTE
"Chief Complaint   Patient presents with     Prenatal Care     39 weeks 1 day- induction questions        Initial /78 (BP Location: Right arm, Patient Position: Sitting, Cuff Size: Adult Regular)   Wt 81.2 kg (179 lb)   LMP 2020  Estimated body mass index is 21.88 kg/m  as calculated from the following:    Height as of 1/10/08: 1.403 m (4' 7.25\").    Weight as of 1/10/08: 43.1 kg (95 lb).  BP completed using cuff size: regular    Questioned patient about current smoking habits.  Pt. has never smoked.          The following HM Due: NONE    39w1d  Jacky Cota CMA                "

## 2021-03-22 NOTE — PROGRESS NOTES
Pt having some mild UCs, no VB, no SROM.  Fetus active.  Cx-1/50/-4/Vtx.  Fetal movement counts BID, rupture of membranes/labor precautions reviewed.  RTC 1 week(s).  Will need to make plans for induction at next visit if undelivered.    Encounter Diagnosis   Name Primary?     Insufficient prenatal care in third trimester Yes       Risk factors listed above are stable and being addressed as noted.    Santy Hughes MD  [facility] [place]

## 2021-03-26 ENCOUNTER — HOSPITAL ENCOUNTER (OUTPATIENT)
Facility: CLINIC | Age: 21
Discharge: HOME | End: 2021-03-26
Attending: FAMILY MEDICINE | Admitting: FAMILY MEDICINE
Payer: COMMERCIAL

## 2021-03-26 ENCOUNTER — NURSE TRIAGE (OUTPATIENT)
Dept: NURSING | Facility: CLINIC | Age: 21
End: 2021-03-26

## 2021-03-26 ENCOUNTER — APPOINTMENT (OUTPATIENT)
Dept: ULTRASOUND IMAGING | Facility: CLINIC | Age: 21
End: 2021-03-26
Attending: FAMILY MEDICINE
Payer: COMMERCIAL

## 2021-03-26 VITALS
DIASTOLIC BLOOD PRESSURE: 75 MMHG | SYSTOLIC BLOOD PRESSURE: 117 MMHG | RESPIRATION RATE: 16 BRPM | HEART RATE: 96 BPM | TEMPERATURE: 97.8 F

## 2021-03-26 DIAGNOSIS — O09.33 INSUFFICIENT PRENATAL CARE IN THIRD TRIMESTER: ICD-10-CM

## 2021-03-26 PROBLEM — Z36.89 ENCOUNTER FOR TRIAGE IN PREGNANT PATIENT: Status: ACTIVE | Noted: 2021-03-26

## 2021-03-26 PROCEDURE — 76819 FETAL BIOPHYS PROFIL W/O NST: CPT

## 2021-03-26 PROCEDURE — G0463 HOSPITAL OUTPT CLINIC VISIT: HCPCS

## 2021-03-27 ENCOUNTER — ANESTHESIA EVENT (OUTPATIENT)
Dept: OBGYN | Facility: CLINIC | Age: 21
End: 2021-03-27
Payer: COMMERCIAL

## 2021-03-27 ENCOUNTER — ANESTHESIA (OUTPATIENT)
Dept: OBGYN | Facility: CLINIC | Age: 21
End: 2021-03-27
Payer: COMMERCIAL

## 2021-03-27 ENCOUNTER — HOSPITAL ENCOUNTER (INPATIENT)
Facility: CLINIC | Age: 21
LOS: 1 days | Discharge: HOME HEALTH | End: 2021-03-28
Attending: FAMILY MEDICINE | Admitting: OBSTETRICS & GYNECOLOGY
Payer: COMMERCIAL

## 2021-03-27 DIAGNOSIS — F41.1 GAD (GENERALIZED ANXIETY DISORDER): ICD-10-CM

## 2021-03-27 LAB
ABO + RH BLD: NORMAL
ABO + RH BLD: NORMAL
LABORATORY COMMENT REPORT: NORMAL
SARS-COV-2 RNA RESP QL NAA+PROBE: NEGATIVE
SPECIMEN EXP DATE BLD: NORMAL
SPECIMEN SOURCE: NORMAL
T PALLIDUM AB SER QL: NONREACTIVE

## 2021-03-27 PROCEDURE — 86780 TREPONEMA PALLIDUM: CPT | Performed by: FAMILY MEDICINE

## 2021-03-27 PROCEDURE — 87635 SARS-COV-2 COVID-19 AMP PRB: CPT | Performed by: FAMILY MEDICINE

## 2021-03-27 PROCEDURE — 00HU33Z INSERTION OF INFUSION DEVICE INTO SPINAL CANAL, PERCUTANEOUS APPROACH: ICD-10-PCS | Performed by: ANESTHESIOLOGY

## 2021-03-27 PROCEDURE — 86900 BLOOD TYPING SEROLOGIC ABO: CPT | Performed by: FAMILY MEDICINE

## 2021-03-27 PROCEDURE — 999N000011 HC STATISTIC ANESTHESIA CASE

## 2021-03-27 PROCEDURE — 999N000080 HC STATISTIC IP LACTATION SERVICES 16-30 MIN

## 2021-03-27 PROCEDURE — 250N000013 HC RX MED GY IP 250 OP 250 PS 637: Performed by: FAMILY MEDICINE

## 2021-03-27 PROCEDURE — 3E0R3BZ INTRODUCTION OF ANESTHETIC AGENT INTO SPINAL CANAL, PERCUTANEOUS APPROACH: ICD-10-PCS | Performed by: ANESTHESIOLOGY

## 2021-03-27 PROCEDURE — G0463 HOSPITAL OUTPT CLINIC VISIT: HCPCS

## 2021-03-27 PROCEDURE — 370N000003 HC ANESTHESIA WARD SERVICE

## 2021-03-27 PROCEDURE — 250N000009 HC RX 250: Performed by: ANESTHESIOLOGY

## 2021-03-27 PROCEDURE — 86901 BLOOD TYPING SEROLOGIC RH(D): CPT | Performed by: FAMILY MEDICINE

## 2021-03-27 PROCEDURE — 250N000011 HC RX IP 250 OP 636

## 2021-03-27 PROCEDURE — 250N000009 HC RX 250: Performed by: FAMILY MEDICINE

## 2021-03-27 PROCEDURE — 250N000013 HC RX MED GY IP 250 OP 250 PS 637: Performed by: OBSTETRICS & GYNECOLOGY

## 2021-03-27 PROCEDURE — 258N000003 HC RX IP 258 OP 636: Performed by: FAMILY MEDICINE

## 2021-03-27 PROCEDURE — 120N000001 HC R&B MED SURG/OB

## 2021-03-27 PROCEDURE — 250N000011 HC RX IP 250 OP 636: Performed by: ANESTHESIOLOGY

## 2021-03-27 PROCEDURE — 722N000001 HC LABOR CARE VAGINAL DELIVERY SINGLE

## 2021-03-27 PROCEDURE — 59410 OBSTETRICAL CARE: CPT | Performed by: OBSTETRICS & GYNECOLOGY

## 2021-03-27 RX ORDER — EPHEDRINE SULFATE 50 MG/ML
INJECTION, SOLUTION INTRAMUSCULAR; INTRAVENOUS; SUBCUTANEOUS
Status: DISCONTINUED
Start: 2021-03-27 | End: 2021-03-27 | Stop reason: HOSPADM

## 2021-03-27 RX ORDER — IBUPROFEN 800 MG/1
800 TABLET, FILM COATED ORAL
Status: COMPLETED | OUTPATIENT
Start: 2021-03-27 | End: 2021-03-27

## 2021-03-27 RX ORDER — METHYLERGONOVINE MALEATE 0.2 MG/ML
200 INJECTION INTRAVENOUS
Status: DISCONTINUED | OUTPATIENT
Start: 2021-03-27 | End: 2021-03-27

## 2021-03-27 RX ORDER — CARBOPROST TROMETHAMINE 250 UG/ML
250 INJECTION, SOLUTION INTRAMUSCULAR
Status: DISCONTINUED | OUTPATIENT
Start: 2021-03-27 | End: 2021-03-28 | Stop reason: HOSPADM

## 2021-03-27 RX ORDER — OXYTOCIN 10 [USP'U]/ML
10 INJECTION, SOLUTION INTRAMUSCULAR; INTRAVENOUS
Status: DISCONTINUED | OUTPATIENT
Start: 2021-03-27 | End: 2021-03-27

## 2021-03-27 RX ORDER — NALOXONE HYDROCHLORIDE 0.4 MG/ML
0.2 INJECTION, SOLUTION INTRAMUSCULAR; INTRAVENOUS; SUBCUTANEOUS
Status: DISCONTINUED | OUTPATIENT
Start: 2021-03-27 | End: 2021-03-27

## 2021-03-27 RX ORDER — OXYCODONE AND ACETAMINOPHEN 5; 325 MG/1; MG/1
1 TABLET ORAL
Status: DISCONTINUED | OUTPATIENT
Start: 2021-03-27 | End: 2021-03-27

## 2021-03-27 RX ORDER — BUPIVACAINE HYDROCHLORIDE 2.5 MG/ML
INJECTION, SOLUTION EPIDURAL; INFILTRATION; INTRACAUDAL PRN
Status: DISCONTINUED | OUTPATIENT
Start: 2021-03-27 | End: 2021-03-27

## 2021-03-27 RX ORDER — EPHEDRINE SULFATE 50 MG/ML
5 INJECTION, SOLUTION INTRAMUSCULAR; INTRAVENOUS; SUBCUTANEOUS
Status: DISCONTINUED | OUTPATIENT
Start: 2021-03-27 | End: 2021-03-27

## 2021-03-27 RX ORDER — SODIUM CHLORIDE, SODIUM LACTATE, POTASSIUM CHLORIDE, CALCIUM CHLORIDE 600; 310; 30; 20 MG/100ML; MG/100ML; MG/100ML; MG/100ML
INJECTION, SOLUTION INTRAVENOUS CONTINUOUS
Status: DISCONTINUED | OUTPATIENT
Start: 2021-03-27 | End: 2021-03-27

## 2021-03-27 RX ORDER — PRENATAL VIT/IRON FUM/FOLIC AC 27MG-0.8MG
1 TABLET ORAL DAILY
Status: DISCONTINUED | OUTPATIENT
Start: 2021-03-27 | End: 2021-03-28 | Stop reason: HOSPADM

## 2021-03-27 RX ORDER — CARBOPROST TROMETHAMINE 250 UG/ML
250 INJECTION, SOLUTION INTRAMUSCULAR
Status: DISCONTINUED | OUTPATIENT
Start: 2021-03-27 | End: 2021-03-27

## 2021-03-27 RX ORDER — FENTANYL/BUPIVACAINE/NS/PF 2-1250MCG
PLASTIC BAG, INJECTION (ML) INJECTION
Status: COMPLETED
Start: 2021-03-27 | End: 2021-03-27

## 2021-03-27 RX ORDER — TRANEXAMIC ACID 10 MG/ML
1 INJECTION, SOLUTION INTRAVENOUS EVERY 30 MIN PRN
Status: DISCONTINUED | OUTPATIENT
Start: 2021-03-27 | End: 2021-03-28 | Stop reason: HOSPADM

## 2021-03-27 RX ORDER — LIDOCAINE HYDROCHLORIDE AND EPINEPHRINE 15; 5 MG/ML; UG/ML
INJECTION, SOLUTION EPIDURAL PRN
Status: DISCONTINUED | OUTPATIENT
Start: 2021-03-27 | End: 2021-03-27

## 2021-03-27 RX ORDER — OXYTOCIN/0.9 % SODIUM CHLORIDE 30/500 ML
100 PLASTIC BAG, INJECTION (ML) INTRAVENOUS CONTINUOUS
Status: DISCONTINUED | OUTPATIENT
Start: 2021-03-27 | End: 2021-03-28 | Stop reason: HOSPADM

## 2021-03-27 RX ORDER — OXYTOCIN 10 [USP'U]/ML
10 INJECTION, SOLUTION INTRAMUSCULAR; INTRAVENOUS
Status: DISCONTINUED | OUTPATIENT
Start: 2021-03-27 | End: 2021-03-28 | Stop reason: HOSPADM

## 2021-03-27 RX ORDER — TRANEXAMIC ACID 10 MG/ML
1 INJECTION, SOLUTION INTRAVENOUS EVERY 30 MIN PRN
Status: DISCONTINUED | OUTPATIENT
Start: 2021-03-27 | End: 2021-03-27

## 2021-03-27 RX ORDER — MISOPROSTOL 200 UG/1
800 TABLET ORAL
Status: DISCONTINUED | OUTPATIENT
Start: 2021-03-27 | End: 2021-03-28 | Stop reason: HOSPADM

## 2021-03-27 RX ORDER — OXYTOCIN/0.9 % SODIUM CHLORIDE 30/500 ML
100-340 PLASTIC BAG, INJECTION (ML) INTRAVENOUS CONTINUOUS PRN
Status: COMPLETED | OUTPATIENT
Start: 2021-03-27 | End: 2021-03-27

## 2021-03-27 RX ORDER — ACETAMINOPHEN 325 MG/1
650 TABLET ORAL EVERY 4 HOURS PRN
Status: DISCONTINUED | OUTPATIENT
Start: 2021-03-27 | End: 2021-03-28 | Stop reason: HOSPADM

## 2021-03-27 RX ORDER — NALBUPHINE HYDROCHLORIDE 10 MG/ML
2.5-5 INJECTION, SOLUTION INTRAMUSCULAR; INTRAVENOUS; SUBCUTANEOUS EVERY 6 HOURS PRN
Status: DISCONTINUED | OUTPATIENT
Start: 2021-03-27 | End: 2021-03-27

## 2021-03-27 RX ORDER — NALOXONE HYDROCHLORIDE 0.4 MG/ML
0.4 INJECTION, SOLUTION INTRAMUSCULAR; INTRAVENOUS; SUBCUTANEOUS
Status: DISCONTINUED | OUTPATIENT
Start: 2021-03-27 | End: 2021-03-27

## 2021-03-27 RX ORDER — BISACODYL 10 MG
10 SUPPOSITORY, RECTAL RECTAL DAILY PRN
Status: DISCONTINUED | OUTPATIENT
Start: 2021-03-29 | End: 2021-03-28 | Stop reason: HOSPADM

## 2021-03-27 RX ORDER — IBUPROFEN 800 MG/1
800 TABLET, FILM COATED ORAL EVERY 6 HOURS PRN
Status: DISCONTINUED | OUTPATIENT
Start: 2021-03-27 | End: 2021-03-28 | Stop reason: HOSPADM

## 2021-03-27 RX ORDER — AMOXICILLIN 250 MG
1 CAPSULE ORAL 2 TIMES DAILY
Status: DISCONTINUED | OUTPATIENT
Start: 2021-03-27 | End: 2021-03-28 | Stop reason: HOSPADM

## 2021-03-27 RX ORDER — HYDROCORTISONE 2.5 %
CREAM (GRAM) TOPICAL 3 TIMES DAILY PRN
Status: DISCONTINUED | OUTPATIENT
Start: 2021-03-27 | End: 2021-03-28 | Stop reason: HOSPADM

## 2021-03-27 RX ORDER — METHYLERGONOVINE MALEATE 0.2 MG/ML
200 INJECTION INTRAVENOUS
Status: DISCONTINUED | OUTPATIENT
Start: 2021-03-27 | End: 2021-03-28 | Stop reason: HOSPADM

## 2021-03-27 RX ORDER — MODIFIED LANOLIN
OINTMENT (GRAM) TOPICAL
Status: DISCONTINUED | OUTPATIENT
Start: 2021-03-27 | End: 2021-03-28 | Stop reason: HOSPADM

## 2021-03-27 RX ORDER — ACETAMINOPHEN 325 MG/1
650 TABLET ORAL EVERY 4 HOURS PRN
Status: DISCONTINUED | OUTPATIENT
Start: 2021-03-27 | End: 2021-03-27

## 2021-03-27 RX ORDER — ONDANSETRON 2 MG/ML
4 INJECTION INTRAMUSCULAR; INTRAVENOUS EVERY 6 HOURS PRN
Status: DISCONTINUED | OUTPATIENT
Start: 2021-03-27 | End: 2021-03-27

## 2021-03-27 RX ORDER — AMOXICILLIN 250 MG
2 CAPSULE ORAL 2 TIMES DAILY
Status: DISCONTINUED | OUTPATIENT
Start: 2021-03-27 | End: 2021-03-28 | Stop reason: HOSPADM

## 2021-03-27 RX ORDER — OXYTOCIN/0.9 % SODIUM CHLORIDE 30/500 ML
340 PLASTIC BAG, INJECTION (ML) INTRAVENOUS CONTINUOUS PRN
Status: DISCONTINUED | OUTPATIENT
Start: 2021-03-27 | End: 2021-03-28 | Stop reason: HOSPADM

## 2021-03-27 RX ADMIN — SODIUM CHLORIDE, POTASSIUM CHLORIDE, SODIUM LACTATE AND CALCIUM CHLORIDE: 600; 310; 30; 20 INJECTION, SOLUTION INTRAVENOUS at 07:52

## 2021-03-27 RX ADMIN — SODIUM CHLORIDE, POTASSIUM CHLORIDE, SODIUM LACTATE AND CALCIUM CHLORIDE 500 ML: 600; 310; 30; 20 INJECTION, SOLUTION INTRAVENOUS at 03:39

## 2021-03-27 RX ADMIN — DOCUSATE SODIUM 50 MG AND SENNOSIDES 8.6 MG 1 TABLET: 8.6; 5 TABLET, FILM COATED ORAL at 21:20

## 2021-03-27 RX ADMIN — LIDOCAINE HYDROCHLORIDE,EPINEPHRINE BITARTRATE 3 ML: 15; .005 INJECTION, SOLUTION EPIDURAL; INFILTRATION; INTRACAUDAL; PERINEURAL at 04:12

## 2021-03-27 RX ADMIN — IBUPROFEN 800 MG: 800 TABLET, FILM COATED ORAL at 17:19

## 2021-03-27 RX ADMIN — ACETAMINOPHEN 650 MG: 325 TABLET, FILM COATED ORAL at 21:20

## 2021-03-27 RX ADMIN — Medication: at 05:00

## 2021-03-27 RX ADMIN — IBUPROFEN 800 MG: 800 TABLET ORAL at 11:07

## 2021-03-27 RX ADMIN — BUPIVACAINE HYDROCHLORIDE 5 ML: 2.5 INJECTION, SOLUTION EPIDURAL; INFILTRATION; INTRACAUDAL at 04:18

## 2021-03-27 RX ADMIN — BUPIVACAINE HYDROCHLORIDE 5 ML: 2.5 INJECTION, SOLUTION EPIDURAL; INFILTRATION; INTRACAUDAL at 04:15

## 2021-03-27 RX ADMIN — Medication 340 ML/HR: at 10:49

## 2021-03-27 NOTE — DISCHARGE INSTRUCTIONS
Discharge Instruction for Undelivered Patients      You were seen for: Labor Assessment  We Consulted: Dr. Senior  You had (Test or Medicine):fetal monitoring, BPP, SVE     Diet:   Drink 8 to 12 glasses of liquids (milk, juice, water) every day.  You may eat meals and snacks.     Activity:  Call your doctor or nurse midwife if your baby is moving less than usual.     Call your provider if you notice:  Swelling in your face or increased swelling in your hands or legs.  Headaches that are not relieved by Tylenol (acetaminophen).  Changes in your vision (blurring: seeing spots or stars.)  Nausea (sick to your stomach) and vomiting (throwing up).   Weight gain of 5 pounds or more per week.  Heartburn that doesn't go away.  Signs of bladder infection: pain when you urinate (use the toilet), need to go more often and more urgently.  The bag of cronin (rupture of membranes) breaks, or you notice leaking in your underwear.  Bright red blood in your underwear.  Abdominal (lower belly) or stomach pain.  For first baby: Contractions (tightening) less than 5 minutes apart for one hour or more.  Second (plus) baby: Contractions (tightening) less than 10 minutes apart and getting stronger.  *If less than 34 weeks: Contractions (tightenings) more than 6 times in one hour.  Increase or change in vaginal discharge (note the color and amount)  Other:     Follow-up:  As scheduled in the clinic

## 2021-03-27 NOTE — ANESTHESIA PREPROCEDURE EVALUATION
Anesthesia Pre-Procedure Evaluation    Patient: Dominique June   MRN: [id number] : 2000        Preoperative Diagnosis: * No surgery found *   Procedure :      Past Medical History:   Diagnosis Date     ASTHMA - MILD INTERMITTENT 12/3/2006     JASSON (generalized anxiety disorder) 2021      Past Surgical History:   Procedure Laterality Date     NO HISTORY OF SURGERY        Allergies   Allergen Reactions     Medroxyprogesterone Anaphylaxis     Depo-Provera      Social History     Tobacco Use     Smoking status: Never Smoker     Smokeless tobacco: Never Used     Tobacco comment: dad smokes   Substance Use Topics     Alcohol use: Never     Frequency: Never      Wt Readings from Last 1 Encounters:   21 81.2 kg (179 lb)        Anesthesia Evaluation            ROS/MED HX  ENT/Pulmonary:     (+) asthma     Neurologic:  - neg neurologic ROS     Cardiovascular:  - neg cardiovascular ROS     METS/Exercise Tolerance:     Hematologic:  - neg hematologic  ROS     Musculoskeletal:  - neg musculoskeletal ROS     GI/Hepatic:     (+) GERD,     Renal/Genitourinary:  - neg Renal ROS     Endo:  - neg endo ROS     Psychiatric/Substance Use:     (+) psychiatric history anxiety     Infectious Disease:  - neg infectious disease ROS     Malignancy:  - neg malignancy ROS     Other:            Physical Exam    Airway        Mallampati: II   TM distance: > 3 FB   Neck ROM: full   Mouth opening: > 3 cm    Respiratory Devices and Support         Dental           Cardiovascular   cardiovascular exam normal          Pulmonary   pulmonary exam normal            Other findings: Lab Test        21                       1510                            WBC          11.3*         --           HGB          10.9*        14.2          MCV          95            --           PLT          222           --            No lab results found.    OUTSIDE LABS:  CBC:   Lab Results   Component Value Date    WBC 11.3 (H) 2021     WBC 12.4 (H) 02/14/2008    HGB 10.9 (L) 01/20/2021    HGB 14.2 08/18/2020    HCT 32.5 (L) 01/20/2021    HCT 43.1 08/18/2020     01/20/2021     02/14/2008     BMP: No results found for: NA, POTASSIUM, CHLORIDE, CO2, BUN, CR, GLC  COAGS: No results found for: PTT, INR, FIBR  POC: No results found for: BGM, HCG, HCGS  HEPATIC: No results found for: ALBUMIN, PROTTOTAL, ALT, AST, GGT, ALKPHOS, BILITOTAL, BILIDIRECT, CONNOR  OTHER: No results found for: PH, LACT, A1C, BELLO, PHOS, MAG, LIPASE, AMYLASE, TSH, T4, T3, CRP, SED    Anesthesia Plan    ASA Status:  2      Anesthesia Type: Epidural.              Consents    Anesthesia Plan(s) and associated risks, benefits, and realistic alternatives discussed. Questions answered and patient/representative(s) expressed understanding.     - Discussed with:  Patient         Postoperative Care       PONV prophylaxis: Ondansetron (or other 5HT-3)     Comments:                Chuck Jackson MD

## 2021-03-27 NOTE — H&P
H&P Update 3/27/2021     No significant change in general health status based on examination of the patient, review of Nursing Admission Database and prenatal record.    21 year old  at 39w6d presented with labor.  EFW AGA 49%ile at 37 wks.  Anx/dep on lexapro, NICU for delivery  ?fetal SVT versus audible acceleration heard in clinic   GBS neg    Admit for labor     Jeri Corado MD, MPH  [facility]

## 2021-03-27 NOTE — L&D DELIVERY NOTE
"Delivery Summary    21 year old  at 39w6d admitted for active labor.  Labored spontaneously.  SROMed.  Epidural for pain control.  Delivered vigorous male infant with apgars pending, weight pending.   Delayed cord clamping.  Placenta spontaneous, intact with 3VC.  No perineal laceration  QBL 55cc.  \"Callejas\"    Jeri Corado MD, MPH  [facility]          Christen June [[id number]]    Labor Event Times    Labor onset date: 3/27/21 Onset time:  3:00 AM   Dilation complete date: 3/27/21 Complete time: 10:30 AM   Start pushing date/time: 3/27/2021 1031      Labor Events     labor?: No   steroids: None  Labor Type: Spontaneous  Predominate monitoring during 1st stage: continuous electronic fetal monitoring     Rupture date/time: 3/27/21 0755   Rupture type: Spontaneous rupture of membranes occuring during spontaneous labor or augmentation  Fluid color: Clear  Fluid odor: Normal     Delivery/Placenta Date and Time    Delivery Date: 3/27/21 Delivery Time: 10:45 AM   Delivering clinician: Jeri Corado MD   Other personnel present at delivery:  Provider Role   Nuvia Ferrer RN Registered Nurse         Vaginal Counts     Initial count performed by 2 team members:  Two Team Members   Issac Bautista RN       Needles Suture Needles Sponges (RETIRED) Instruments   Initial counts 2  5    Added to count       Relief counts       Final counts             Placed during labor Accounted for at the end of labor   FSE NA    IUPC NA    Cervadil                       Apgars    Living status: Living   1 Minute 5 Minute 10 Minute 15 Minute 20 Minute   Skin color:        Heart rate:        Reflex irritability:        Muscle tone:        Respiratory effort:        Total:           Cord    Vessels: 3 Vessels    Cord Complications: None               Cord Blood Disposition: Lab    Gases Sent?: No    Delayed cord clamping?: Yes    Cord Clamping Delay (seconds): 31-60 seconds    Stem " cell collection?: No        Resuscitation    Methods: None     Labor Events and Shoulder Dystocia    Shoulder dystocia present?: Neg     Delivery (Maternal) (Provider to Complete) ([id number])    Episiotomy: None  Perineal lacerations: None    Genital tract inspection done: Pos     Blood Loss  Mother: Dominique June #[id number]   Start of Mother's Information    IO Blood Loss  21 0300 - 21 1058    Delivery QBL (mL) Hospital Encounter 57 mL    Total  57 mL         End of Mother's Information  Mother: Dominique June #[id number]          Delivery - Provider to Complete ([id number])    Delivering clinician: Jeri Corado MD  Attempted Delivery Types (Choose all that apply): Spontaneous Vaginal Delivery  Delivery Type (Choose the 1 that will go to the Birth History): Vaginal, Spontaneous                   Other personnel:  Provider Role   Nuvia Ferrer, RN Registered Nurse                Placenta    Removal: Spontaneous  Disposition: Hospital disposal           Anesthesia    Method: Epidural  Cervical dilation at placement: 4-7                Presentation and Position    Presentation: Vertex                  Jeri Corado MD

## 2021-03-27 NOTE — PROVIDER NOTIFICATION
03/27/21 0359   Epidural Placement Care   Epidural Request/Placement anesthesiologist at bedside   Anesthesiologist/CRNA Renetta NINA here to place epidural

## 2021-03-27 NOTE — TELEPHONE ENCOUNTER
"  Reason for Disposition    [1] History of prior delivery (multipara) AND [2] contractions < 10 minutes apart AND [3] present 1 hour    Additional Information    Negative: Passed out (i.e., lost consciousness, collapsed and was not responding)    Negative: Shock suspected (e.g., cold/pale/clammy skin, too weak to stand, low BP, rapid pulse)    Negative: Difficult to awaken or acting confused (e.g., disoriented, slurred speech)    Negative: [1] SEVERE abdominal pain (e.g., excruciating) AND [2] constant AND [3] present > 1 hour    Negative: Severe bleeding (e.g., continuous red blood from vagina, or large blood clots)    Negative: Umbilical cord hanging out of the vagina (shiny, white, curled appearance, \"like telephone cord\")    Negative: Uncontrollable urge to push (i.e., feels like baby is coming out now)    Negative: Can see baby    Negative: Sounds like a life-threatening emergency to the triager    Negative: Pregnant < 37 weeks (i.e., )    Negative: [1] Uncertain delivery date AND [2] possibly pregnant < 37 weeks (i.e., )    Negative: [1] First baby (primipara) AND [2] contractions < 6 minutes apart  AND [3] present 2 hours    Protocols used: PREGNANCY - LABOR-A-AH    "

## 2021-03-27 NOTE — PROVIDER NOTIFICATION
03/27/21 0329   Provider Notification   Provider Name/Title Dr. Senior   Method of Notification Phone   Request Evaluate - Remote   Notification Reason Patient Arrived;SVE     OB updated patient discharged at 0000 and came back more uncomfortable. GBS-. Patient requesting an epidural. Intrapartum and epidural orders received. OB 15 minutes away.

## 2021-03-27 NOTE — TELEPHONE ENCOUNTER
Pt is calling.    OB Triage Call    Reason for call: Contractions, in Labor    Assessment: Contractions every 3-4 minutes since 2:00pm today. In the last hour they have become very painful. Has to breathe through them. Denies leaking of fluid, but positive for bloody show. Good fetal movement.    Plan: To Labor and Delivery    Patient plans to deliver at [facility]   Patient's primary OB Provider is Dr Hughes    Patient's primary OB provider is a Physician.  Labor and delivery at [facility] ([phone]) notified of patient's pending arrival.  Report given to REYES, RN      39w5d  Estimated Date of Delivery: Mar 28, 2021        OB History    Para Term  AB Living   3 1 1 0 1 1   SAB TAB Ectopic Multiple Live Births   1 0 0 0 1      # Outcome Date GA Lbr Vern/2nd Weight Sex Delivery Anes PTL Lv   3 Current            2 SAB 2020 6w0d          1 Term 19 39w0d  3.289 kg (7 lb 4 oz) F Vag-Spont EPI  KEITH       Lab Results   Component Value Date    GBS Negative 2021          Debo Estrada 21 7:33 PM  [facility] Nurse Advisor

## 2021-03-27 NOTE — PLAN OF CARE
Data: Patient presented to [facility]: 3/26/2021  9:01 PM.  Reason for maternal/fetal assessment is uterine contractions. Patient reports alfonso since 1400 today. Contractions every 4 minutes per patient.  Patient is a .  Prenatal record reviewed. Pregnancy has been uncomplicated..  Gestational Age 39w5d. VSS. Fetal movement present. Patient denies leaking of vaginal fluid/rupture of membranes, vaginal bleeding, abdominal pain, pelvic pressure, nausea, vomiting, headache, visual disturbances, epigastric or URQ pain, significant edema. Support person is present.   Action: Verbal consent for EFM. Triage assessment completed. Bill of rights reviewed.  Response: Patient verbalized agreement with plan. Will contact Dr Shari Calhoun with update and further orders.

## 2021-03-27 NOTE — LACTATION NOTE
"Lactation visit. Patient called out for latching assistance. Once LC arrived,  latched on left breast with what appeared to be a good latch. Lips flanged, wide mouth. She denies any discomfort with latch. Demonstrated breast compression for increased swallowing. Education and support provided regarding normal course of lactation, nutritive versus non-nutritive sucking, encouraged hand expression after feedings, and answered all questions. Patient states difficulty breast feeding her first, \"She just wouldn't latch, so I ended up mostly pumping.\" She states overproduction and getting mastitis around 2 months and her supply did not come back after nor did she have the desire to continue. Encouraged her to continue to call out for feeding assistance and/or assessment during her stay.  "

## 2021-03-27 NOTE — ANESTHESIA PROCEDURE NOTES
Epidural catheter Procedure Note  Pre-Procedure   Staff -        Anesthesiologist:  Chuck Jackson MD       Performed By: anesthesiologist       Referred By: Unique       Location: OB       Pre-Anesthestic Checklist: patient identified, IV checked, risks and benefits discussed, informed consent, monitors and equipment checked, pre-op evaluation, at physician/surgeon's request and post-op pain management  Timeout:       Correct Patient: Yes        Correct Procedure: Yes        Correct Site: Yes        Correct Position: Yes   Procedure DocumentationProcedure: epidural catheter  Comments:  Patient desires Labor Epidural for labor analgesia. Vaginal delivery anticipated.    Chart reviewed. Patient examined. No changes to pre procedure chart review. Risks including but not limited to bleeding, infection, nerve injury, PDPH, intrathecal injection, high block, incomplete block, one-sided block, back pain, and low blood pressure discussed in detail. Questions answered. Consent signed.    Pause for the Cause completed. NIBP and pulse ox functioning. L&D nurse present.    Procedure: Sitting. Betadine prep x 3. Sterile drape applied.  Lidocaine 1% x 2 cc local infiltration at L 3-4.  17 G. Tu needle ML MAY 1 attempt.  No CSF, paresthesia or blood. 20 g. Epidural catheter inserted w/o resistance 5 cm.  Negative aspiration for CSF and blood. Filter in line.  Test dose Lidocaine 1.5% w/ 1:200,000 epi x 3 cc injected. Negative for neuro change or symptoms of intravascular injection.  Bolus dose: Marcaine 0.25% 5cc x 2 doses (10 cc total).  Infusion orders written.    I or my partner am immediately available. I or my partner will monitor the patient and supervise nursing care at necessary intervals.    JAKElana

## 2021-03-27 NOTE — PLAN OF CARE
Data: Dominique June transferred to [facility] via wheelchair at 1305. Baby transferred via parent's arms.  Action: Receiving unit notified of transfer: Yes. Patient and family notified of room change. Report given to FEDERICO Fong at 1310. Belongings sent to receiving unit. Accompanied by Registered Nurse. Oriented patient to surroundings. Call light within reach. ID bands double-checked with receiving RN.  Response: Patient tolerated transfer and is stable.

## 2021-03-27 NOTE — PROVIDER NOTIFICATION
03/26/21 4088   Provider Notification   Provider Name/Title Dr. Senior   Method of Notification Phone   Request Evaluate - Remote   Notification Reason Other (Comment)     OB updated SVE unchanged. Baseline 155. Occasional random variable decels noted, they do not coincide with contractions. Order received for BPP. If BPP is normal, discharge orders received. Patient is fairly uncomfortable and requesting pain meds. If patient wants pain meds, OB may keep patient to watch overnight.

## 2021-03-27 NOTE — PLAN OF CARE
Data: Patient assessed in the [facility]place for uterine contractions.  Cervical exam posterior, dilated to 1, thick and soft.  Membranes intact.  Contractions 2-4 minutes. BPP 8/8. Patient states she is comfortable going home.   Action:  Presumed adequate fetal oxygenation documented (see flow record). Discharge instructions reviewed.  Patient instructed to report change in fetal movement, vaginal leaking of fluid or bleeding, abdominal pain, or any concerns related to the pregnancy to her nurse/physician.    Response: Orders to discharge home per Shari Calhoun.  Patient verbalized understanding of education and verbalized agreement with plan. Discharged to home at 2359.

## 2021-03-27 NOTE — PROVIDER NOTIFICATION
03/27/21 0855   Provider Notification   Provider Name/Title Dr Corado   Method of Notification Electronic Page   Request Evaluate in Person   Notification Reason SVE;Labor Status   Dr Corado updated on multip here laboring since overnight. Has epidural; feeling pressure. SVE 8/90/0. Did not push long with last delivery. MD will come for delivery.

## 2021-03-27 NOTE — PROGRESS NOTES
Assumed care of patient with bedside report from labor RN. Patient tolerated transfer well. Oriented to room, call light, instructed to call prior to getting up due to right leg still being less stable. Paperwork and feeding log reviewed with patient and spouse, both verbalize understanding and agree to plan of care.    Hetal Root, RN on 3/27/2021 at 1:42 PM

## 2021-03-27 NOTE — PROVIDER NOTIFICATION
21   Provider Notification   Provider Name/Title Dr. Senior   Method of Notification Phone   Request Evaluate - Remote   Notification Reason Patient Arrived;SVE;Status Update     OB updated  39.5 here for r/o labor. Fetal baseline 155-165, accels and moderate variability. SVE unchanged from clinic visit /-3. Contractions every 2-4 minutes. Plan to recheck cervix in an hour. OB would like call back with fetal baseline update.

## 2021-03-28 VITALS
SYSTOLIC BLOOD PRESSURE: 120 MMHG | HEART RATE: 79 BPM | DIASTOLIC BLOOD PRESSURE: 81 MMHG | TEMPERATURE: 98 F | RESPIRATION RATE: 16 BRPM

## 2021-03-28 LAB — HGB BLD-MCNC: 11 G/DL (ref 11.7–15.7)

## 2021-03-28 PROCEDURE — 85018 HEMOGLOBIN: CPT | Performed by: OBSTETRICS & GYNECOLOGY

## 2021-03-28 PROCEDURE — 250N000013 HC RX MED GY IP 250 OP 250 PS 637: Performed by: OBSTETRICS & GYNECOLOGY

## 2021-03-28 PROCEDURE — 36415 COLL VENOUS BLD VENIPUNCTURE: CPT | Performed by: OBSTETRICS & GYNECOLOGY

## 2021-03-28 RX ORDER — ESCITALOPRAM OXALATE 10 MG/1
10 TABLET ORAL DAILY
Qty: 30 TABLET | Refills: 3 | Status: SHIPPED | OUTPATIENT
Start: 2021-03-28 | End: 2021-06-08 | Stop reason: ALTCHOICE

## 2021-03-28 RX ORDER — ACETAMINOPHEN 500 MG
1000 TABLET ORAL EVERY 6 HOURS PRN
Qty: 40 TABLET | Refills: 1 | Status: SHIPPED | OUTPATIENT
Start: 2021-03-28 | End: 2021-05-11

## 2021-03-28 RX ORDER — IBUPROFEN 800 MG/1
800 TABLET, FILM COATED ORAL EVERY 6 HOURS PRN
Qty: 30 TABLET | Refills: 1 | Status: SHIPPED | OUTPATIENT
Start: 2021-03-28 | End: 2021-05-11

## 2021-03-28 RX ADMIN — ACETAMINOPHEN 650 MG: 325 TABLET, FILM COATED ORAL at 04:58

## 2021-03-28 RX ADMIN — IBUPROFEN 800 MG: 800 TABLET, FILM COATED ORAL at 06:07

## 2021-03-28 RX ADMIN — PRENATAL VITAMINS-IRON FUMARATE 27 MG IRON-FOLIC ACID 0.8 MG TABLET 1 TABLET: at 09:08

## 2021-03-28 RX ADMIN — ACETAMINOPHEN 650 MG: 325 TABLET, FILM COATED ORAL at 09:08

## 2021-03-28 RX ADMIN — IBUPROFEN 800 MG: 800 TABLET, FILM COATED ORAL at 00:02

## 2021-03-28 RX ADMIN — IBUPROFEN 800 MG: 800 TABLET, FILM COATED ORAL at 13:50

## 2021-03-28 RX ADMIN — DOCUSATE SODIUM 50 MG AND SENNOSIDES 8.6 MG 1 TABLET: 8.6; 5 TABLET, FILM COATED ORAL at 09:08

## 2021-03-28 NOTE — PLAN OF CARE
VSS; voiding without difficulty and denies any clots. Very attentive to baby and nursing independently. Managing her pain with motrin.

## 2021-03-28 NOTE — DISCHARGE INSTRUCTIONS
[facility]: [phone]    Postpartum Vaginal Delivery Instructions    Activity       Ask family and friends for help when you need it.    Do not place anything in your vagina for 6 weeks.    You are not restricted on other activities, but take it easy for a few weeks to allow your body to recover from delivery.  You are able to do any activities you feel up to that point.    No driving until you have stopped taking your pain medications (usually two weeks after delivery).     Call your health care provider if you have any of these symptoms:       Increased pain, swelling, redness, or fluid around your stiches from an episiotomy or perineal tear.    A fever above 100.4 F (38 C) with or without chills when placing a thermometer under your tongue.    You soak a sanitary pad with blood within 1 hour, or you see blood clots larger than a golf ball.    Bleeding that lasts more than 6 weeks.    Vaginal discharge that smells bad.    Severe pain, cramping or tenderness in your lower belly area.    A need to urinate more frequently (use the toilet more often), more urgently (use the toilet very quickly), or it burns when you urinate.    Nausea and vomiting.    Redness, swelling or pain around a vein in your leg.    Problems breastfeeding or a red or painful area on your breast.    Chest pain and cough or are gasping for air.    Problems coping with sadness, anxiety, or depression.  If you have any concerns about hurting yourself or the baby, call your provider immediately.     You have questions or concerns after you return home.     Keep your hands clean:  Always wash your hands before touching your perineal area and stitches.  This helps reduce your risk of infection.  If your hands aren't dirty, you may use an alcohol hand-rub to clean your hands. Keep your nails clean and short.

## 2021-03-28 NOTE — DISCHARGE SUMMARY
"VAGINAL DELIVERY DISCHARGE SUMMARY    Admit date: 3/27/2021  Discharge date: 3/28/2021     Admit Dx:   21 year old  at 39w6d    Indication for care in labor or delivery [O75.9]   GBS neg  History of depression/anxiety    Discharge Dx:  - Same as above, s/p     Procedures:  - Spontaneous vaginal delivery    Admit HPI:  21 year old  at 39w6d presented with labor.  EFW AGA 49%ile at 37 wks.  Anx/dep on lexapro, NICU for delivery  ?fetal SVT versus audible acceleration heard in clinic   GBS neg     Admit for labor      Please see her admit H&P for full details of her PMH, PSH, Meds, Allergies and exam on admit.    Hospital course:  21 year old  at 39w6d admitted for active labor.  Labored spontaneously.  SROMed.  Epidural for pain control.  Delivered vigorous male infant with apgars pending, weight pending.   Delayed cord clamping.  Placenta spontaneous, intact with 3VC.  No perineal laceration  QBL 55cc.  \"Callejas\"     Please see her Delivery Summary for full details regarding her delivery.    Her postpartum course was complicated by nothing. By the time of discharge, she was meeting all of her postpartum goals and deemed stable for discharge. She was voiding without difficulty, tolerating a regular diet without nausea and vomiting, her pain was well controlled on oral pain medicines and her lochia was appropriate. She was hemodynamically stable.     Physical exam on the day of discharge:  Vitals:    21 1315 21 1538 21 2300 21 0002   BP: 122/72 120/68 121/68 120/67   Pulse: 90 83 64 68   Resp: 16 18  17   Temp: 98.7  F (37.1  C) 97.7  F (36.5  C) 97.9  F (36.6  C) 97.9  F (36.6  C)   TempSrc: Oral Oral  Oral       General: sitting up, alert and cooperative  Abd: soft, non-distended, non-tender. Fundus firm, nontender, 2 cm below umbilicus.   Extremities: calves nontender, trace edema of lower extremities bilaterally    Lab Results   Component Value Date    HGB 11.0 " 03/28/2021    HGB 10.9 01/20/2021     Blood type:   Lab Results   Component Value Date    RH Pos 03/27/2021       Discharge/Disposition:  Ms. Dominique June was discharged to home in stable condition with the following instructions/medications:  1) Call for temperature > 100.4, foul smelling vaginal discharge, bleeding > 1 pad per hour x 2 hrs, pain not controlled by oral pain meds, thoughts of self-harm or harming the infant.  2) Contraception counseling was provided.  3) She was instructed to follow-up with her primary OB in 6 weeks for a routine postpartum visit, and in 1 week for a blood pressure check if having any blood pressure issues while hospitalized.  4) She was instructed to continue her PNV on discharge if she wished to breast feed her infant.  5) She was discharged home with the following medications: lexapro, ibuprofen, tylenol.         Jeri Corado MD, MPH  [facility]

## 2021-03-28 NOTE — PLAN OF CARE
Data: Vital signs within normal limits. Postpartum checks within normal limits - see flow record. Patient eating and drinking normally. Patient able to empty bladder independently and is up ambulating. No apparent signs of infection.  Perineum  healing well. Patient performing self cares and is able to care for infant.  Action: Patient medicated during the shift for pain. See MAR. Patient reassessed within 1 hour after each medication and pain was improved - patient stated she was comfortable. Patient education done about discharge and infant cares. See flow record.  Response: Positive attachment behaviors observed with infant. Support persons are present.     Discharge instructions discussed and all questions and concerns addressed. Pt states she has a breast pump at home. Discharge medications were e-prescribed to an outside pharmacy. Pt discharged with infant and SO to home in private transportation at 1515.

## 2021-03-28 NOTE — PLAN OF CARE
Patient VSS. Ambulating and independent with cares. Pain is well managed with PRN Ibuprofen and Tylenol. Breastfeeding well with some assistance requested from staff. Infant has been sleepy overnight with attempts made to breastfeed. HE reviewed and EBM fed back to infant. Parents are attentive to infant needs and bonding well with infant.

## 2021-03-28 NOTE — PROGRESS NOTES
"Care Management Discharge Note    Discharge Date: 21       Discharge Disposition:  MOB to discharge home with .  FOB also present in the home.  TEDDY states she wants to discharge early because she misses her 18 month old at home.           Discharge Services: [facility] nurse will visit MOB and baby tomorrow per referral from pediatrician.  Discharge DME:  n/a    Discharge Transportation:   will provide    Private pay costs discussed: n/a    Education Provided on the Discharge Plan:  SW provided TEDDY with resources for Post Partum Depression as well as list of mental health providers.  TEDDY states she will contact her physician if she feels like the medication prescribed for her depression (Lexapro) is not effective. TEDDY said she was on Lexapro after her first child was born and then went off it when she found out she was pregnant with the second baby.  TEDDY states she finds it very helpful to be on the medication and she has been \"counting the days\" till she can get on it again.   TEDDY said her primary reason for taking Lexapro is not depression but rather to deal with her anxiety.  TEDDY also indicated she has not had counseling in the past nor has she been official diagnosed with depression or anxiety.    Persons Notified of Discharge Plans: Nursing staff and family  Patient/Family in Agreement with the Plan:  Yes    Handoff Referral Completed: Yes    Additional Information:  TEDDY states she has her mother to help and she lives in [place].  TEDDY said her dad is currently in town from [place] and will remain here until he moves to [place] in the next couple of weeks.  TEDDY also describes multiple members of her family that can help as well.  FORENETTA states he only works 10 minutes from home and he can leave at any time if necessary to help his wife care for  Their two children.  TEDDY verbalized understanding of what to do to help herself  with anxiety and depression should it develop when she is home.  "         Debo Marrero, [facility] HospitalSW

## 2021-03-28 NOTE — ANESTHESIA POSTPROCEDURE EVALUATION
Patient: Dominique June    * No procedures listed *    Diagnosis:* No pre-op diagnosis entered *  Diagnosis Additional Information: No value filed.    Anesthesia Type:  Epidural    Note:  Disposition: Inpatient   Postop Pain Control: Uneventful            Sign Out: Well controlled pain   PONV: No   Neuro/Psych: Uneventful            Sign Out: Acceptable/Baseline neuro status   Airway/Respiratory: Uneventful            Sign Out: Acceptable/Baseline resp. status   CV/Hemodynamics: Uneventful            Sign Out: Acceptable CV status   Other NRE: NONE   DID A NON-ROUTINE EVENT OCCUR? No    Event details/Postop Comments:  S/P epidural for labor. I or my partner remained immediately available, monitored the patient, and supervised nursing staff at key events and necessary intervals.    The patient is doing well. VSS Temp normal. Satisfactory cardiovascular and repiratory function. Neuro at baseline. Denies positional headache. Minimal side effects easily managed w/ PRN meds. No apparent anesthetic complications. No follow-up required.    [email or web address]         Last vitals:  Vitals:    03/27/21 1538 03/27/21 2300 03/28/21 0002   BP: 120/68 121/68 120/67   Pulse: 83 64 68   Resp: 18  17   Temp: 97.7  F (36.5  C) 97.9  F (36.6  C) 97.9  F (36.6  C)       Last vitals prior to Anesthesia Care Transfer:      Electronically Signed By: Chuck Jackson MD  March 28, 2021  7:17 AM

## 2021-03-30 ENCOUNTER — MEDICAL CORRESPONDENCE (OUTPATIENT)
Dept: HEALTH INFORMATION MANAGEMENT | Facility: CLINIC | Age: 21
End: 2021-03-30

## 2021-05-11 ENCOUNTER — PRENATAL OFFICE VISIT (OUTPATIENT)
Dept: OBGYN | Facility: CLINIC | Age: 21
End: 2021-05-11
Payer: COMMERCIAL

## 2021-05-11 VITALS — SYSTOLIC BLOOD PRESSURE: 120 MMHG | DIASTOLIC BLOOD PRESSURE: 68 MMHG | WEIGHT: 167 LBS

## 2021-05-11 DIAGNOSIS — F41.1 GAD (GENERALIZED ANXIETY DISORDER): ICD-10-CM

## 2021-05-11 DIAGNOSIS — Z30.011 ENCOUNTER FOR INITIAL PRESCRIPTION OF CONTRACEPTIVE PILLS: ICD-10-CM

## 2021-05-11 DIAGNOSIS — Z12.4 PAP SMEAR FOR CERVICAL CANCER SCREENING: ICD-10-CM

## 2021-05-11 PROBLEM — O09.33 INSUFFICIENT PRENATAL CARE IN THIRD TRIMESTER: Status: RESOLVED | Noted: 2021-01-22 | Resolved: 2021-05-11

## 2021-05-11 PROCEDURE — G0145 SCR C/V CYTO,THINLAYER,RESCR: HCPCS | Performed by: ADVANCED PRACTICE MIDWIFE

## 2021-05-11 PROCEDURE — 99207 PR POST PARTUM EXAM: CPT | Performed by: ADVANCED PRACTICE MIDWIFE

## 2021-05-11 PROCEDURE — 99N1016 PR STATISTIC CYTO-THIN LAYER QC G0145: Performed by: ADVANCED PRACTICE MIDWIFE

## 2021-05-11 RX ORDER — ACETAMINOPHEN AND CODEINE PHOSPHATE 120; 12 MG/5ML; MG/5ML
0.35 SOLUTION ORAL DAILY
Qty: 28 TABLET | Refills: 11 | Status: SHIPPED | OUTPATIENT
Start: 2021-05-11 | End: 2021-08-06

## 2021-05-11 NOTE — PROGRESS NOTES
"Midwife Postpartum 6 Week Visit    Dominique June is a 21 year old here for a postpartum checkup.     Delivery date was 3/27/21. She had a  of a viable boy \"Callejas\", weight 8 pounds 5.2 oz., with no complications      Since delivery, she has been pumping breast milk exclusively.   She has not had any signs of infection, her lochia stopped after 4wks.    She is voiding and having bowel movements without difficulty.       Contraception was discussed and patient desires oral contraceptives.     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 She states she was given the injection as a teenager and instantly became \"hot and splotchy\" and couldn't bend her fingers. She was transported to the ED and stayed in the hospital for 2 days. She states she was told she was likely allergic to a preservative in the  Injection. She has since used an IUD and micronor for birth control without issue.     She  has not had intercourse since delivery.   She complains of No  perineal discomfort.     Reports she had a pap in 2019 in [place] which was \"abnormal\". She was pregnant at the time. She states she was told to repeat pap after delivery but never did.     Mood is Stable   Restarted Lexapro after delivery and feels this is helping her mood tremendously.   Patient screened for postpartum depression.   Depression Rating was:   Last PHQ-9 score on record = No flowsheet data found.  Last GAD7 score on record = No flowsheet data found.      ROS:  CONSTITUTIONAL: NEGATIVE for fever, chills, change in weight  INTEGUMENTARU/SKIN: NEGATIVE for worrisome rashes, moles or lesions  EYES: NEGATIVE for vision changes or irritation  ENT: NEGATIVE for ear, mouth and throat problems  RESP: NEGATIVE for significant cough or SOB  BREAST: NEGATIVE for masses, tenderness or discharge  CV: NEGATIVE for chest pain, palpitations or peripheral edema  GI: NEGATIVE for nausea, abdominal pain, heartburn, or change in bowel " habits  : NEGATIVE for unusual urinary or vaginal symptoms.  MUSCULOSKELETAL: NEGATIVE for significant arthralgias or myalgia  NEURO: NEGATIVE for weakness, dizziness or paresthesias  PSYCHIATRIC: NEGATIVE for changes in mood or affect      Current Outpatient Medications:      escitalopram (LEXAPRO) 10 MG tablet, Take 1 tablet (10 mg) by mouth daily, Disp: 30 tablet, Rfl: 3     norethindrone (MICRONOR) 0.35 MG tablet, Take 1 tablet (0.35 mg) by mouth daily, Disp: 28 tablet, Rfl: 11.   OB History    Para Term  AB Living   3 2 2 0 1 2   SAB TAB Ectopic Multiple Live Births   1 0 0 0 2      # Outcome Date GA Lbr Vern/2nd Weight Sex Delivery Anes PTL Lv   3 Term 21 39w6d 07:30 / 00:15 3.775 kg (8 lb 5.2 oz) M Vag-Spont EPI N KEITH      Name: JOHN VERDUGO      Apgar1: 8  Apgar5: 9   2 SAB 2020 6w0d          1 Term 19 39w0d  3.289 kg (7 lb 4 oz) F Vag-Spont EPI  KEITH     Last pap:  No results found for: PAP  Hgb in hospital was 11.0    EXAM:  /68 (BP Location: Left arm, Cuff Size: Adult Regular)   Wt 75.8 kg (167 lb)   LMP 2020   Breastfeeding Yes   BMI: There is no height or weight on file to calculate BMI.  Exam:  Constitutional: healthy, alert and no distress  Musculoskeletal: extremities normal- no gross deformities noted, gait normal and normal muscle tone  Skin: no suspicious lesions or rashes  Neurologic: Gait normal. Reflexes normal and symmetric. Sensation grossly WNL.  Psychiatric: mentation appears normal and affect normal/bright    PELVIC EXAM:  Vulva: No lesions, well healed, BUS WNL, no tenderness  Vagina: Moist, pink, discharge normal  well rugated, no lesions  Cervix:smooth, pink, no visible lesions  Rectal exam: deferred      ASSESSMENT/PLAN:    1. (Z39.2) Routine postpartum follow-up  (primary encounter diagnosis)      2. (Z12.4) Pap smear for cervical cancer screening  Plan: Pap imaged thin layer screen only - recommended        age 21 - 24 years    3.  (Z30.011) Encounter for initial prescription of contraceptive pills  Plan: norethindrone (MICRONOR) 0.35 MG tablet  - Refills given x 1 yr  - Called pharmacy to discuss - per pharmacy as pt has been on Micronor in the past without issue, her anaphylactic reaction to depo-provera injection was likely a response to an additive.   - Reviewed how to safely take this medication, warning signs to watch out for, and importance of daily dosing at the exact same time    4. (F41.1) JASSON (generalized anxiety disorder)  - Stable, Lexapro restarted after delivery       Return as needed or at time of next expected pap, pelvic, or breast exam.      EMEKA Romero, CNM

## 2021-05-11 NOTE — PATIENT INSTRUCTIONS
Progesterone Only Oral Contraceptive Pills (POPs/Minipill)      Minipills contain only progesterone. Combined pills contain both estrogen and progesterone. Minipills are a great option for women who are breastfeeding or for women who cannot take estrogen. POPs do not increase your risk for blood clots like combined OCP's do. Women who are breastfeeding should call to change contraceptive type when they are done breastfeeding.     How it works:    The minipill effectively prevents pregnancy by actions of the progesterone hormone on various parts of the reproductive system. It makes cervical mucus too thick for sperm to move easily through, it makes the lining of the uterus too thin for a fertilized egg to grow, and it sometimes prevents ovulation all together. The minipill is slightly less effective than combined pills, the pregnancy rate is about 3%.    How to take the minipill:      Minipills come in packs containing several weeks of pills, each pill is marked in the packs so that one pill is taken every day of the week      Unlike combined OCPs there are no placebo pills      Start the first pack of pills on the first day of your menstrual period, if you are postpartum you can start the pills right away      It is extremely important to take the pill AT THE SAME TIME EVERY DAY (at least within the same hour)      As soon as you finish one pack, start another      If you experience illness such as nausea and vomiting or diarrhea use a backup method for 7 days       Missed/forgotten pills:      If you miss one pill take it as soon as you remember, take your next pill the next day at your usual time and used a backup method like a condom for 7 days if it was more than 3 hours late              If you miss two pills do not take the missed pills, just continue taking your minipill as you normally would, but make sure to use a backup method for the duration of that package, until you start a new one      If you miss  more than two pills please contact the clinic      Menstrual changes:      Women taking the minipill often experience short/irregular cycles or may not have a period at all      You may also experience some spotting or bleeding in between your cycles      Contact the clinic if:      You miss one or two pills and then do not get a period      If you have been experiencing normal periods and unexpectedly miss one      If you have any symptoms of pregnancy such as breast tenderness, increased tiredness, or cramping in your lower abdomen      If you have heavy or prolonged bleeding, abdominal pain, fever, or cramps    You can stop taking the minipill if you wish to get pregnant.     Please call with any questions or concerns:    [facility]  [phone]

## 2021-05-14 LAB
COPATH REPORT: NORMAL
PAP: NORMAL

## 2021-05-17 ENCOUNTER — PATIENT OUTREACH (OUTPATIENT)
Dept: OBGYN | Facility: CLINIC | Age: 21
End: 2021-05-17

## 2021-05-17 NOTE — TELEPHONE ENCOUNTER
"2019 Reports she had a pap in 2019 in [place] which was \"abnormal\". She was pregnant at the time. She states she was told to repeat pap after delivery but never did. (copied and pasted from 5/11/21 visit notes)  5/11/21 NIL pap. Plan 1 year pap  "

## 2021-06-04 ENCOUNTER — MEDICAL CORRESPONDENCE (OUTPATIENT)
Dept: HEALTH INFORMATION MANAGEMENT | Facility: CLINIC | Age: 21
End: 2021-06-04

## 2021-06-08 ENCOUNTER — VIRTUAL VISIT (OUTPATIENT)
Dept: INTERNAL MEDICINE | Facility: CLINIC | Age: 21
End: 2021-06-08
Payer: COMMERCIAL

## 2021-06-08 DIAGNOSIS — F41.9 ANXIETY: Primary | ICD-10-CM

## 2021-06-08 PROCEDURE — 96127 BRIEF EMOTIONAL/BEHAV ASSMT: CPT | Mod: 95 | Performed by: INTERNAL MEDICINE

## 2021-06-08 PROCEDURE — 99204 OFFICE O/P NEW MOD 45 MIN: CPT | Mod: 95 | Performed by: INTERNAL MEDICINE

## 2021-06-08 ASSESSMENT — ANXIETY QUESTIONNAIRES
5. BEING SO RESTLESS THAT IT IS HARD TO SIT STILL: SEVERAL DAYS
1. FEELING NERVOUS, ANXIOUS, OR ON EDGE: MORE THAN HALF THE DAYS
6. BECOMING EASILY ANNOYED OR IRRITABLE: SEVERAL DAYS
GAD7 TOTAL SCORE: 11
2. NOT BEING ABLE TO STOP OR CONTROL WORRYING: SEVERAL DAYS
7. FEELING AFRAID AS IF SOMETHING AWFUL MIGHT HAPPEN: MORE THAN HALF THE DAYS
IF YOU CHECKED OFF ANY PROBLEMS ON THIS QUESTIONNAIRE, HOW DIFFICULT HAVE THESE PROBLEMS MADE IT FOR YOU TO DO YOUR WORK, TAKE CARE OF THINGS AT HOME, OR GET ALONG WITH OTHER PEOPLE: SOMEWHAT DIFFICULT
3. WORRYING TOO MUCH ABOUT DIFFERENT THINGS: MORE THAN HALF THE DAYS

## 2021-06-08 ASSESSMENT — PATIENT HEALTH QUESTIONNAIRE - PHQ9: 5. POOR APPETITE OR OVEREATING: MORE THAN HALF THE DAYS

## 2021-06-08 NOTE — PROGRESS NOTES
Dominique is a 21 year old who is being evaluated via a billable video visit.      How would you like to obtain your AVS? MyChart  If the video visit is dropped, the invitation should be resent by: Text to cell phone: [phone]  Will anyone else be joining your video visit? No    Video Start Time: 1:04 PM    Assessment & Plan     (F41.9) Anxiety  (primary encounter diagnosis)  Plan: Patient has not seen any improvement with the Lexapro, advised pt to stop taking Lexapro and patient was started on sertraline (ZOLOFT) 50 MG tablet 1 tablet daily as directed.explained clearly about the medication,insructions and side effects.  Patient was advised to follow-up in clinic in 1 month.            Prescription drug management       Return in about 4 weeks (around 7/6/2021) for Anxiety follow up.    Brock Live MD  [facility]   Dominique is a 21 year old who presents for the following health issues     HPI       Anxiety Follow-Up    How are you doing with your anxiety since your last visit?  Patient was on Lexapro in 2019 and stopped taking when she became pregnant and was restarted in March 2021 after her childbirth.  Patient has not seen much improvement with Lexapro still feels anxious, nervous, irritable.     Are you having other symptoms that might be associated with anxiety? No    Have you had a significant life event? No     Are you feeling depressed? No    Do you have any concerns with your use of alcohol or other drugs? No      JASSON-7 SCORE 6/8/2021   Total Score 11         Past Medical History:   Diagnosis Date     Abnormal Pap smear of cervix 01/01/2019    Pt reported abnormal pap in 2019 in [place] during pregnancy. Told to repeat pap after delivery but never did. (copied and pasted from 5/11/21 visit notes) 5/11/21 NIL pap     JASSON (generalized anxiety disorder) 01/22/2021       Current Outpatient Medications   Medication Sig Dispense Refill     norethindrone  (MICRONOR) 0.35 MG tablet Take 1 tablet (0.35 mg) by mouth daily 28 tablet 11     sertraline (ZOLOFT) 50 MG tablet Take 1 tablet (50 mg) by mouth daily 31 tablet 0       Review of Systems   CONSTITUTIONAL: NEGATIVE for fever, chills, change in weight  RESP: NEGATIVE for significant cough or SOB  CV: NEGATIVE for chest pain, palpitations or peripheral edema  PSYCHIATRIC: anxiety      Objective           Vitals:  No vitals were obtained today due to virtual visit.    Physical Exam   GENERAL: Healthy, alert and no distress  EYES: Eyes grossly normal to inspection.  No discharge or erythema, or obvious scleral/conjunctival abnormalities.  RESP: No audible wheeze, cough, or visible cyanosis.  No visible retractions or increased work of breathing.    PSYCH: Mentation appears normal, affect normal/bright, judgement and insight intact, normal speech and appearance well-groomed.          Video-Visit Details    Type of service:  Video Visit    Video End Time:1:14 PM    Originating Location (pt. Location): Home    Distant Location (provider location):  [facility]     Platform used for Video Visit: Emily

## 2021-06-09 ASSESSMENT — ANXIETY QUESTIONNAIRES: GAD7 TOTAL SCORE: 11

## 2021-07-06 DIAGNOSIS — F41.9 ANXIETY: ICD-10-CM

## 2021-07-07 ENCOUNTER — OFFICE VISIT (OUTPATIENT)
Dept: INTERNAL MEDICINE | Facility: CLINIC | Age: 21
End: 2021-07-07
Payer: COMMERCIAL

## 2021-07-07 VITALS
SYSTOLIC BLOOD PRESSURE: 118 MMHG | OXYGEN SATURATION: 98 % | RESPIRATION RATE: 20 BRPM | DIASTOLIC BLOOD PRESSURE: 65 MMHG | HEIGHT: 64 IN | WEIGHT: 165.2 LBS | BODY MASS INDEX: 28.2 KG/M2 | HEART RATE: 102 BPM | TEMPERATURE: 98.2 F

## 2021-07-07 DIAGNOSIS — F41.9 ANXIETY: Primary | ICD-10-CM

## 2021-07-07 PROCEDURE — 99214 OFFICE O/P EST MOD 30 MIN: CPT | Performed by: INTERNAL MEDICINE

## 2021-07-07 RX ORDER — BUPROPION HYDROCHLORIDE 150 MG/1
150 TABLET ORAL EVERY MORNING
Qty: 30 TABLET | Refills: 0 | Status: SHIPPED | OUTPATIENT
Start: 2021-07-07 | End: 2021-08-04

## 2021-07-07 ASSESSMENT — MIFFLIN-ST. JEOR: SCORE: 1489.34

## 2021-07-07 ASSESSMENT — ANXIETY QUESTIONNAIRES
2. NOT BEING ABLE TO STOP OR CONTROL WORRYING: MORE THAN HALF THE DAYS
7. FEELING AFRAID AS IF SOMETHING AWFUL MIGHT HAPPEN: MORE THAN HALF THE DAYS
6. BECOMING EASILY ANNOYED OR IRRITABLE: NEARLY EVERY DAY
5. BEING SO RESTLESS THAT IT IS HARD TO SIT STILL: MORE THAN HALF THE DAYS
1. FEELING NERVOUS, ANXIOUS, OR ON EDGE: NEARLY EVERY DAY
IF YOU CHECKED OFF ANY PROBLEMS ON THIS QUESTIONNAIRE, HOW DIFFICULT HAVE THESE PROBLEMS MADE IT FOR YOU TO DO YOUR WORK, TAKE CARE OF THINGS AT HOME, OR GET ALONG WITH OTHER PEOPLE: VERY DIFFICULT
GAD7 TOTAL SCORE: 16
3. WORRYING TOO MUCH ABOUT DIFFERENT THINGS: MORE THAN HALF THE DAYS

## 2021-07-07 ASSESSMENT — PATIENT HEALTH QUESTIONNAIRE - PHQ9: 5. POOR APPETITE OR OVEREATING: MORE THAN HALF THE DAYS

## 2021-07-07 NOTE — NURSING NOTE
"Pulse 114   Temp 98.2  F (36.8  C) (Oral)   Resp 20   Ht 1.626 m (5' 4\")   Wt 74.9 kg (165 lb 3.2 oz)   SpO2 98%   BMI 28.36 kg/m    Patient in for recheck medication.  Bela Johnson, KELSIE    "

## 2021-07-07 NOTE — PROGRESS NOTES
"    Assessment & Plan     (F41.9) Anxiety  (primary encounter diagnosis)  Plan: Anxiety has worsened since our last office visit, patient was advised to stop Zoloft and started on buPROPion (WELLBUTRIN XL) 150 MG 24 hr tablet 1 tablet daily as directed.explained clearly about the medication,insructions and side effects.  Patient was advised to follow-up in 3 to 4 weeks.  Recommended therapist but patient declined at this time.  Call or return to clinic prn if these symtoms worsen, fail to improve as anticipated, or if new symptoms develop.             Prescription drug management        BMI:   Estimated body mass index is 28.36 kg/m  as calculated from the following:    Height as of this encounter: 1.626 m (5' 4\").    Weight as of this encounter: 74.9 kg (165 lb 3.2 oz).   Weight management plan: Discussed healthy diet and exercise guidelines      Return in about 4 weeks (around 8/4/2021) for Anxiety follow up.    Brock Live MD  [facility] is [facility] 21 [facility] who presents for the following health issues     HPI     Anxiety Follow-Up    How are you doing with your anxiety since your last visit? Worsened patient was started on Zoloft 50 mg at last office visit, patient says she has not seen any changes in her anxiety and has been more irritable and angry in the last few weeks.  Patient states she has a 3-month-old baby, has been colicky and patient is stressed.    Are you having other symptoms that might be associated with anxiety? No    Have you had a significant life event? No     Are you feeling depressed? No    Do you have any concerns with your use of alcohol or other drugs? No    JASSON-7 SCORE 6/8/2021 7/7/2021   Total Score 11 16         Past Medical History:   Diagnosis Date     Abnormal Pap smear of cervix 01/01/2019    Pt reported abnormal pap in 2019 in [place] during pregnancy. Told to repeat pap after delivery but never did. (copied and pasted " "from 5/11/21 visit notes) 5/11/21 NIL pap     JASSON (generalized anxiety disorder) 01/22/2021       Current Outpatient Medications   Medication Sig Dispense Refill     buPROPion (WELLBUTRIN XL) 150 MG 24 hr tablet Take 1 tablet (150 mg) by mouth every morning 30 tablet 0     norethindrone (MICRONOR) 0.35 MG tablet Take 1 tablet (0.35 mg) by mouth daily 28 tablet 11         Review of Systems   CONSTITUTIONAL: NEGATIVE for fever, chills, change in weight  RESP: NEGATIVE for significant cough or SOB  CV: NEGATIVE for chest pain, palpitations or peripheral edema  PSYCHIATRIC: anxiety      Objective    /65   Pulse 102   Temp 98.2  F (36.8  C) (Oral)   Resp 20   Ht 1.626 m (5' 4\")   Wt 74.9 kg (165 lb 3.2 oz)   SpO2 98%   BMI 28.36 kg/m    Body mass index is 28.36 kg/m .  Physical Exam   GENERAL: healthy, alert and no distress  RESP: lungs clear to auscultation - no rales, rhonchi or wheezes  CV: regular rate and rhythm, normal S1 S2, no S3 or S4, no murmur, click or rub, no peripheral edema and peripheral pulses strong  PSYCH: mentation appears normal, affect normal/bright                "

## 2021-07-08 ENCOUNTER — VIRTUAL VISIT (OUTPATIENT)
Dept: OBGYN | Facility: CLINIC | Age: 21
End: 2021-07-08
Payer: COMMERCIAL

## 2021-07-08 DIAGNOSIS — Z30.016 ENCOUNTER FOR INITIAL PRESCRIPTION OF TRANSDERMAL PATCH HORMONAL CONTRACEPTIVE DEVICE: Primary | ICD-10-CM

## 2021-07-08 PROCEDURE — 99213 OFFICE O/P EST LOW 20 MIN: CPT | Mod: 95 | Performed by: OBSTETRICS & GYNECOLOGY

## 2021-07-08 RX ORDER — NORELGESTROMIN AND ETHINYL ESTRADIOL 35; 150 UG/MG; UG/MG
PATCH TRANSDERMAL
Qty: 9 PATCH | Refills: 3 | Status: SHIPPED | OUTPATIENT
Start: 2021-07-08 | End: 2022-06-24

## 2021-07-08 ASSESSMENT — ANXIETY QUESTIONNAIRES: GAD7 TOTAL SCORE: 16

## 2021-07-08 NOTE — PROGRESS NOTES
Dominique June is a 21 year old female who is being evaluated via a billable telephone visit.      What phone number would you like to be contacted at? [phone]  How would you like to obtain your AVS? Delmy      Dominique June is a 21 year old female who presents for virtual visit today for the following health issue(s):  Patient presents with:  Medication Request: patient is currently taking the pill, and would like to switch to the patch if possible. She is no longer breastfeeding.       Additional information: patient scored 6 on phq-2. She had a visit yesterday with her PCP and had her medication adjusted. So she is being treated for her sx's.

## 2021-07-08 NOTE — TELEPHONE ENCOUNTER
"Per 7/7/21 office visit note:  \"(F41.9) Anxiety  (primary encounter diagnosis)  Plan: Anxiety has worsened since our last office visit, patient was advised to stop Zoloft and started on buPROPion (WELLBUTRIN XL) 150 MG 24 hr tablet 1 tablet daily as directed.explained clearly about the medication,insructions and side effects.  Patient was advised to follow-up in 3 to 4 weeks.\"    Denied prescription.  "

## 2021-07-08 NOTE — PROGRESS NOTES
SUBJECTIVE:   CC: contraceptive counseling                                               Dominique June is a 21 year old  female who presents for telephone consult today for contraceptive counseling. On minipill now but done breast feeding and wants something longer lasting. Has used kyleena in the past and had daily bleeding for the entire 3 months she was using it. Reports anaphylactic reaction to second depo dose in high school. Was told the reaction was to the stabilizer in the injection. Tolerated micronor without difficulty. Interested in the patch.     Problem list and histories reviewed & adjusted, as indicated.  Additional history: as documented.    ROS:  Const: no weight changes, fever, chills  Heme: no hx blood clots  Neuro: no hx migraine, no aura       buPROPion (WELLBUTRIN XL) 150 MG 24 hr tablet, Take 1 tablet (150 mg) by mouth every morning  norethindrone (MICRONOR) 0.35 MG tablet, Take 1 tablet (0.35 mg) by mouth daily    No current facility-administered medications on file prior to visit.     Allergies   Allergen Reactions     Medroxyprogesterone Anaphylaxis     Depo-Provera       OBJECTIVE:   Psych: mood stable, appropriate affect  Neuro: A+Ox3       ASSESSMENT/PLAN:                                                    Dominique June is a 21 year old female  here for telephone consult for contraception management. All methods of birth control including oral contraceptive pills, patches, vaginal ring, implant, shot, IUD (hormonal and copper), barrier methods discussed including risks, benefits, and alternatives to each. She is choosing to proceed with the combined hormonal patch.  Reviewed risk, benefits, and alternatives and common SE.     1. Encounter for initial prescription of transdermal patch hormonal contraceptive device  - norelgestromin-ethinyl estradiol (ORTHO EVRA) 150-35 MCG/24HR patch; Remove old patch and apply new patch onto the skin once a week for 3 weeks (21 days). Do  not wear patch week 4 (days 22-28), then repeat.  Dispense: 9 patch; Refill: 3     Ale Ramirez MD  Obstetrics and Gynecology   [facility]

## 2021-08-06 ENCOUNTER — VIRTUAL VISIT (OUTPATIENT)
Dept: INTERNAL MEDICINE | Facility: CLINIC | Age: 21
End: 2021-08-06
Payer: COMMERCIAL

## 2021-08-06 DIAGNOSIS — F41.9 ANXIETY: Primary | ICD-10-CM

## 2021-08-06 PROCEDURE — 96127 BRIEF EMOTIONAL/BEHAV ASSMT: CPT | Mod: 95 | Performed by: INTERNAL MEDICINE

## 2021-08-06 PROCEDURE — 99214 OFFICE O/P EST MOD 30 MIN: CPT | Mod: 95 | Performed by: INTERNAL MEDICINE

## 2021-08-06 RX ORDER — BUPROPION HYDROCHLORIDE 300 MG/1
300 TABLET ORAL EVERY MORNING
Qty: 30 TABLET | Refills: 0 | Status: SHIPPED | OUTPATIENT
Start: 2021-08-06 | End: 2021-08-31

## 2021-08-06 ASSESSMENT — ANXIETY QUESTIONNAIRES
3. WORRYING TOO MUCH ABOUT DIFFERENT THINGS: NOT AT ALL
1. FEELING NERVOUS, ANXIOUS, OR ON EDGE: SEVERAL DAYS
GAD7 TOTAL SCORE: 5
5. BEING SO RESTLESS THAT IT IS HARD TO SIT STILL: NOT AT ALL
6. BECOMING EASILY ANNOYED OR IRRITABLE: NEARLY EVERY DAY
IF YOU CHECKED OFF ANY PROBLEMS ON THIS QUESTIONNAIRE, HOW DIFFICULT HAVE THESE PROBLEMS MADE IT FOR YOU TO DO YOUR WORK, TAKE CARE OF THINGS AT HOME, OR GET ALONG WITH OTHER PEOPLE: SOMEWHAT DIFFICULT
7. FEELING AFRAID AS IF SOMETHING AWFUL MIGHT HAPPEN: SEVERAL DAYS
2. NOT BEING ABLE TO STOP OR CONTROL WORRYING: NOT AT ALL

## 2021-08-06 ASSESSMENT — PATIENT HEALTH QUESTIONNAIRE - PHQ9: 5. POOR APPETITE OR OVEREATING: NOT AT ALL

## 2021-08-06 NOTE — PROGRESS NOTES
Dominique is a 21 year old who is being evaluated via a billable video visit.  Changed to telephone visit due to connectivity issues    How would you like to obtain your AVS? MyChart  If the video visit is dropped, the invitation should be resent by: Text to cell phone: [phone]  Will anyone else be joining your video visit? No      Video Start Time: 1:28 PM - had connectivity issues and visit changed to phone visit 2;19 pm - 2;26 pm    Assessment & Plan     (F41.9) Anxiety  (primary encounter diagnosis)  Comment: Significant improvement but still has some symptoms of irritability and gets angry easily  Plan: Increase buPROPion (WELLBUTRIN XL) pt 300 MG 24 hr tablet 1 tablet daily as directed.explained clearly about the medication,insructions and side effects.  Patient was advised to follow-up in 1 month         Prescription drug management       Return in about 4 weeks (around 9/3/2021).    Brock Live MD  [facility]   Dominique is a 21 year old who presents for the following health issues     HPI       Anxiety Follow-Up    How are you doing with your anxiety since your last visit? improved but still has symptoms of irritability, was started on Wellbutrin 150 mg once daily at last office visit tolerating well    Are you having other symptoms that might be associated with anxiety? No    Have you had a significant life event? No     Are you feeling depressed? No    Do you have any concerns with your use of alcohol or other drugs? No    JASSON-7 SCORE 6/8/2021 7/7/2021 8/6/2021   Total Score 11 16 5       Past Medical History:   Diagnosis Date     Abnormal Pap smear of cervix 01/01/2019    Pt reported abnormal pap in 2019 in [place] during pregnancy. Told to repeat pap after delivery but never did. (copied and pasted from 5/11/21 visit notes) 5/11/21 NIL pap     JASSON (generalized anxiety disorder) 01/22/2021       Current Outpatient Medications   Medication Sig  Dispense Refill     buPROPion (WELLBUTRIN XL) 300 MG 24 hr tablet Take 1 tablet (300 mg) by mouth every morning 30 tablet 0     norelgestromin-ethinyl estradiol (ORTHO EVRA) 150-35 MCG/24HR patch Remove old patch and apply new patch onto the skin once a week for 3 weeks (21 days). Do not wear patch week 4 (days 22-28), then repeat. 9 patch 3         Review of Systems   CONSTITUTIONAL: NEGATIVE for fever, chills, change in weight  RESP: NEGATIVE for significant cough or SOB  CV: NEGATIVE for chest pain, palpitations or peripheral edema  PSYCHIATRIC: NEGATIVE for changes in mood or affect      Objective           Vitals:  No vitals were obtained today due to virtual visit.    Physical Exam   GENERAL: Healthy, alert and no distress  RESP: No audible wheeze, cough,   PSYCH: Mentation appears normal, affect normal/bright, judgement and insight intact, normal speech        Phone visit 7 minutes.

## 2021-08-07 ASSESSMENT — ANXIETY QUESTIONNAIRES: GAD7 TOTAL SCORE: 5

## 2021-08-19 ENCOUNTER — MEDICAL CORRESPONDENCE (OUTPATIENT)
Dept: HEALTH INFORMATION MANAGEMENT | Facility: CLINIC | Age: 21
End: 2021-08-19

## 2021-08-23 ENCOUNTER — MYC MEDICAL ADVICE (OUTPATIENT)
Dept: INTERNAL MEDICINE | Facility: CLINIC | Age: 21
End: 2021-08-23

## 2021-08-23 NOTE — TELEPHONE ENCOUNTER
patient can do virtual visit, I have virtual visit spot open tomorrow at 3:40 PM please schedule and inform patient

## 2021-08-31 ENCOUNTER — VIRTUAL VISIT (OUTPATIENT)
Dept: INTERNAL MEDICINE | Facility: CLINIC | Age: 21
End: 2021-08-31
Payer: COMMERCIAL

## 2021-08-31 DIAGNOSIS — F41.9 ANXIETY: ICD-10-CM

## 2021-08-31 PROCEDURE — 99213 OFFICE O/P EST LOW 20 MIN: CPT | Mod: 95 | Performed by: INTERNAL MEDICINE

## 2021-08-31 RX ORDER — BUPROPION HYDROCHLORIDE 300 MG/1
300 TABLET ORAL EVERY MORNING
Qty: 30 TABLET | Refills: 6 | Status: SHIPPED | OUTPATIENT
Start: 2021-08-31 | End: 2023-10-05

## 2021-08-31 ASSESSMENT — ANXIETY QUESTIONNAIRES
2. NOT BEING ABLE TO STOP OR CONTROL WORRYING: NOT AT ALL
GAD7 TOTAL SCORE: 4
1. FEELING NERVOUS, ANXIOUS, OR ON EDGE: SEVERAL DAYS
IF YOU CHECKED OFF ANY PROBLEMS ON THIS QUESTIONNAIRE, HOW DIFFICULT HAVE THESE PROBLEMS MADE IT FOR YOU TO DO YOUR WORK, TAKE CARE OF THINGS AT HOME, OR GET ALONG WITH OTHER PEOPLE: NOT DIFFICULT AT ALL
3. WORRYING TOO MUCH ABOUT DIFFERENT THINGS: SEVERAL DAYS
5. BEING SO RESTLESS THAT IT IS HARD TO SIT STILL: NOT AT ALL
7. FEELING AFRAID AS IF SOMETHING AWFUL MIGHT HAPPEN: SEVERAL DAYS
6. BECOMING EASILY ANNOYED OR IRRITABLE: SEVERAL DAYS

## 2021-08-31 ASSESSMENT — PATIENT HEALTH QUESTIONNAIRE - PHQ9: 5. POOR APPETITE OR OVEREATING: NOT AT ALL

## 2021-08-31 NOTE — PROGRESS NOTES
Dominique is a 21 year old who is being evaluated via a billable telephone visit.      What phone number would you like to be contacted at? [phone]  How would you like to obtain your AVS? Mail a copy    Assessment & Plan     (F41.9) Anxiety  Comment: improved  Plan: buPROPion (WELLBUTRIN XL) 300 MG 24 hr tablet refilled as directed.explained clearly about the medication,insructions and side effects.  F/u in 6 months       Prescription drug management       Return in about 6 months (around 2/28/2022).    Brock Live MD  [facility]   Dominique is a 21 year old who presents for the following health issues   Patient is being seen to have her medication refilled.  HPI       Anxiety Follow-Up    How are you doing with your anxiety since your last visit? Improved     Are you having other symptoms that might be associated with anxiety? No    Have you had a significant life event? No     Are you feeling depressed? No    Do you have any concerns with your use of alcohol or other drugs? No    JASSON-7 SCORE 7/7/2021 8/6/2021 8/31/2021   Total Score 16 5 4         Past Medical History:   Diagnosis Date     Abnormal Pap smear of cervix 01/01/2019    Pt reported abnormal pap in 2019 in [place] during pregnancy. Told to repeat pap after delivery but never did. (copied and pasted from 5/11/21 visit notes) 5/11/21 NIL pap     JASSON (generalized anxiety disorder) 01/22/2021       Current Outpatient Medications   Medication Sig Dispense Refill     buPROPion (WELLBUTRIN XL) 300 MG 24 hr tablet Take 1 tablet (300 mg) by mouth every morning 30 tablet 6     norelgestromin-ethinyl estradiol (ORTHO EVRA) 150-35 MCG/24HR patch Remove old patch and apply new patch onto the skin once a week for 3 weeks (21 days). Do not wear patch week 4 (days 22-28), then repeat. 9 patch 3       Review of Systems   CONSTITUTIONAL: NEGATIVE for fever, chills, change in weight  RESP: NEGATIVE for significant  cough or SOB  CV: NEGATIVE for chest pain, palpitations or peripheral edema  PSYCHIATRIC: NEGATIVE for changes in mood or affect      Objective           Vitals:  No vitals were obtained today due to virtual visit.    Physical Exam   healthy, alert and no distress  PSYCH: Alert and oriented times 3; coherent speech, normal   rate and volume, able to articulate logical thoughts, able   to abstract reason, no tangential thoughts, no hallucinations   or delusions  Her affect is normal  RESP: No cough, no audible wheezing, able to talk in full sentences  Remainder of exam unable to be completed due to telephone visits    1;37 pm -1;44 pm   Phone call duration: 7 minutes

## 2021-09-01 ASSESSMENT — ANXIETY QUESTIONNAIRES: GAD7 TOTAL SCORE: 4

## 2021-09-04 ENCOUNTER — HEALTH MAINTENANCE LETTER (OUTPATIENT)
Age: 21
End: 2021-09-04

## 2021-09-30 ENCOUNTER — MEDICAL CORRESPONDENCE (OUTPATIENT)
Dept: HEALTH INFORMATION MANAGEMENT | Facility: CLINIC | Age: 21
End: 2021-09-30

## 2021-09-30 ENCOUNTER — APPOINTMENT (OUTPATIENT)
Dept: CT IMAGING | Facility: CLINIC | Age: 21
End: 2021-09-30
Attending: EMERGENCY MEDICINE
Payer: COMMERCIAL

## 2021-09-30 ENCOUNTER — HOSPITAL ENCOUNTER (EMERGENCY)
Facility: CLINIC | Age: 21
Discharge: HOME | End: 2021-10-01
Attending: INTERNAL MEDICINE | Admitting: INTERNAL MEDICINE
Payer: COMMERCIAL

## 2021-09-30 DIAGNOSIS — K52.9 COLITIS: ICD-10-CM

## 2021-09-30 LAB
ALBUMIN SERPL-MCNC: 4.1 G/DL (ref 3.4–5)
ALBUMIN UR-MCNC: 20 MG/DL
ALP SERPL-CCNC: 108 U/L (ref 40–150)
ALT SERPL W P-5'-P-CCNC: 20 U/L (ref 0–50)
ANION GAP SERPL CALCULATED.3IONS-SCNC: 10 MMOL/L (ref 3–14)
APPEARANCE UR: ABNORMAL
AST SERPL W P-5'-P-CCNC: 19 U/L (ref 0–45)
BASOPHILS # BLD AUTO: 0.1 10E3/UL (ref 0–0.2)
BASOPHILS NFR BLD AUTO: 1 %
BILIRUB DIRECT SERPL-MCNC: 0.1 MG/DL (ref 0–0.2)
BILIRUB SERPL-MCNC: 0.5 MG/DL (ref 0.2–1.3)
BILIRUB UR QL STRIP: NEGATIVE
BUN SERPL-MCNC: 9 MG/DL (ref 7–30)
CALCIUM SERPL-MCNC: 9.4 MG/DL (ref 8.5–10.1)
CHLORIDE BLD-SCNC: 106 MMOL/L (ref 94–109)
CO2 SERPL-SCNC: 21 MMOL/L (ref 20–32)
COLOR UR AUTO: YELLOW
CREAT SERPL-MCNC: 0.96 MG/DL (ref 0.52–1.04)
EOSINOPHIL # BLD AUTO: 0.3 10E3/UL (ref 0–0.7)
EOSINOPHIL NFR BLD AUTO: 1 %
ERYTHROCYTE [DISTWIDTH] IN BLOOD BY AUTOMATED COUNT: 12.5 % (ref 10–15)
GFR SERPL CREATININE-BSD FRML MDRD: 84 ML/MIN/1.73M2
GLUCOSE BLD-MCNC: 103 MG/DL (ref 70–99)
GLUCOSE UR STRIP-MCNC: NEGATIVE MG/DL
HCG UR QL: NEGATIVE
HCT VFR BLD AUTO: 45.1 % (ref 35–47)
HGB BLD-MCNC: 15.2 G/DL (ref 11.7–15.7)
HGB UR QL STRIP: NEGATIVE
IMM GRANULOCYTES # BLD: 0.1 10E3/UL
IMM GRANULOCYTES NFR BLD: 1 %
KETONES UR STRIP-MCNC: 20 MG/DL
LEUKOCYTE ESTERASE UR QL STRIP: NEGATIVE
LIPASE SERPL-CCNC: 56 U/L (ref 73–393)
LYMPHOCYTES # BLD AUTO: 3.1 10E3/UL (ref 0.8–5.3)
LYMPHOCYTES NFR BLD AUTO: 14 %
MCH RBC QN AUTO: 30.9 PG (ref 26.5–33)
MCHC RBC AUTO-ENTMCNC: 33.7 G/DL (ref 31.5–36.5)
MCV RBC AUTO: 92 FL (ref 78–100)
MONOCYTES # BLD AUTO: 1.1 10E3/UL (ref 0–1.3)
MONOCYTES NFR BLD AUTO: 5 %
MUCOUS THREADS #/AREA URNS LPF: PRESENT /LPF
NEUTROPHILS # BLD AUTO: 17.9 10E3/UL (ref 1.6–8.3)
NEUTROPHILS NFR BLD AUTO: 78 %
NITRATE UR QL: NEGATIVE
NRBC # BLD AUTO: 0 10E3/UL
NRBC BLD AUTO-RTO: 0 /100
PH UR STRIP: 5.5 [PH] (ref 5–7)
PLATELET # BLD AUTO: 352 10E3/UL (ref 150–450)
POTASSIUM BLD-SCNC: 3.8 MMOL/L (ref 3.4–5.3)
PROT SERPL-MCNC: 8.4 G/DL (ref 6.8–8.8)
RBC # BLD AUTO: 4.92 10E6/UL (ref 3.8–5.2)
RBC URINE: 1 /HPF
SODIUM SERPL-SCNC: 137 MMOL/L (ref 133–144)
SP GR UR STRIP: 1.03 (ref 1–1.03)
SQUAMOUS EPITHELIAL: 9 /HPF
UROBILINOGEN UR STRIP-MCNC: 2 MG/DL
WBC # BLD AUTO: 22.6 10E3/UL (ref 4–11)
WBC URINE: 4 /HPF

## 2021-09-30 PROCEDURE — 36415 COLL VENOUS BLD VENIPUNCTURE: CPT | Performed by: EMERGENCY MEDICINE

## 2021-09-30 PROCEDURE — 83690 ASSAY OF LIPASE: CPT | Performed by: INTERNAL MEDICINE

## 2021-09-30 PROCEDURE — 82248 BILIRUBIN DIRECT: CPT | Performed by: INTERNAL MEDICINE

## 2021-09-30 PROCEDURE — 258N000003 HC RX IP 258 OP 636: Performed by: EMERGENCY MEDICINE

## 2021-09-30 PROCEDURE — 85025 COMPLETE CBC W/AUTO DIFF WBC: CPT | Performed by: INTERNAL MEDICINE

## 2021-09-30 PROCEDURE — 81025 URINE PREGNANCY TEST: CPT | Performed by: EMERGENCY MEDICINE

## 2021-09-30 PROCEDURE — 250N000011 HC RX IP 250 OP 636: Performed by: INTERNAL MEDICINE

## 2021-09-30 PROCEDURE — 85025 COMPLETE CBC W/AUTO DIFF WBC: CPT | Performed by: EMERGENCY MEDICINE

## 2021-09-30 PROCEDURE — 85652 RBC SED RATE AUTOMATED: CPT | Performed by: INTERNAL MEDICINE

## 2021-09-30 PROCEDURE — 96361 HYDRATE IV INFUSION ADD-ON: CPT

## 2021-09-30 PROCEDURE — 96374 THER/PROPH/DIAG INJ IV PUSH: CPT | Mod: 59

## 2021-09-30 PROCEDURE — 250N000011 HC RX IP 250 OP 636: Performed by: EMERGENCY MEDICINE

## 2021-09-30 PROCEDURE — 74177 CT ABD & PELVIS W/CONTRAST: CPT

## 2021-09-30 PROCEDURE — 80048 BASIC METABOLIC PNL TOTAL CA: CPT | Performed by: EMERGENCY MEDICINE

## 2021-09-30 PROCEDURE — 250N000009 HC RX 250: Performed by: INTERNAL MEDICINE

## 2021-09-30 PROCEDURE — 99285 EMERGENCY DEPT VISIT HI MDM: CPT | Mod: 25

## 2021-09-30 PROCEDURE — 80048 BASIC METABOLIC PNL TOTAL CA: CPT | Performed by: INTERNAL MEDICINE

## 2021-09-30 PROCEDURE — 81001 URINALYSIS AUTO W/SCOPE: CPT | Performed by: INTERNAL MEDICINE

## 2021-09-30 PROCEDURE — 81001 URINALYSIS AUTO W/SCOPE: CPT | Performed by: EMERGENCY MEDICINE

## 2021-09-30 PROCEDURE — 86140 C-REACTIVE PROTEIN: CPT | Performed by: INTERNAL MEDICINE

## 2021-09-30 RX ORDER — ONDANSETRON 2 MG/ML
4 INJECTION INTRAMUSCULAR; INTRAVENOUS
Status: COMPLETED | OUTPATIENT
Start: 2021-09-30 | End: 2021-09-30

## 2021-09-30 RX ORDER — HYDROCODONE BITARTRATE AND ACETAMINOPHEN 5; 325 MG/1; MG/1
1-2 TABLET ORAL EVERY 6 HOURS PRN
Qty: 20 TABLET | Refills: 0 | Status: SHIPPED | OUTPATIENT
Start: 2021-09-30 | End: 2023-10-05

## 2021-09-30 RX ORDER — IOPAMIDOL 755 MG/ML
500 INJECTION, SOLUTION INTRAVASCULAR ONCE
Status: COMPLETED | OUTPATIENT
Start: 2021-09-30 | End: 2021-09-30

## 2021-09-30 RX ORDER — ONDANSETRON 4 MG/1
4 TABLET, ORALLY DISINTEGRATING ORAL EVERY 8 HOURS PRN
Qty: 20 TABLET | Refills: 0 | Status: SHIPPED | OUTPATIENT
Start: 2021-09-30 | End: 2021-11-30

## 2021-09-30 RX ADMIN — SODIUM CHLORIDE 58 ML: 9 INJECTION, SOLUTION INTRAVENOUS at 22:59

## 2021-09-30 RX ADMIN — SODIUM CHLORIDE 1000 ML: 9 INJECTION, SOLUTION INTRAVENOUS at 22:17

## 2021-09-30 RX ADMIN — IOPAMIDOL 82 ML: 755 INJECTION, SOLUTION INTRAVENOUS at 22:59

## 2021-09-30 RX ADMIN — ONDANSETRON 4 MG: 2 INJECTION INTRAMUSCULAR; INTRAVENOUS at 20:28

## 2021-09-30 ASSESSMENT — ENCOUNTER SYMPTOMS
DIARRHEA: 1
ABDOMINAL DISTENTION: 1
ABDOMINAL PAIN: 1
VOMITING: 1
NAUSEA: 1
BACK PAIN: 1

## 2021-09-30 NOTE — ED TRIAGE NOTES
"Mid abdominal pain, feels \"gnawing\" then got a sharp pain in right lower abdomen. Has felt nauseated and having diarrhea. Has not taken anything for pain. Urine was darker than usual today.   "

## 2021-10-01 VITALS
HEART RATE: 66 BPM | OXYGEN SATURATION: 99 % | DIASTOLIC BLOOD PRESSURE: 89 MMHG | TEMPERATURE: 98.1 F | RESPIRATION RATE: 18 BRPM | SYSTOLIC BLOOD PRESSURE: 131 MMHG

## 2021-10-01 LAB
CRP SERPL-MCNC: 11.6 MG/L (ref 0–8)
ERYTHROCYTE [SEDIMENTATION RATE] IN BLOOD BY WESTERGREN METHOD: 16 MM/HR (ref 0–20)

## 2021-10-01 NOTE — ED PROVIDER NOTES
Hospitalist Progress Note      Subjective:  Patient seen and examined by me. No overnight complaints or events    Blood pressure slightly elevated today on the 5 mg dose of lisinopril but asymptomatic    Objective:  Review of Systems  General: Patient doing well. Denies fever, fatigue.  ENT: No nasal discharge, sore throat, change in hearing.   Eyes: No change in vision, no eye pain.   Neck: No masses, no soreness, no change in ROM.  Cardiac: No chest pain. No palpitations. No chest wall tenderness.  Pulmonary: No shortness of breath, wheezing. No cough.   GI: No abdominal pain. No diarrhea, constipation.   : No change in urination habits. No burning with urination.  Endocrine: No weight loss, weight gain. No heat or cold intolerance.  Extremities: No lower extremity swelling. No upper extremity swelling.   Neuro: No numbness, tingling.   Musculoskeletal: No change in strength or endurance.   Psych: No depression, hallucinations, confusion.    Ordered Medications:  Medications (7) Active  Scheduled: (4)  heparin 5000 units/mL Vial  5,000 unit 1 mL, SubQ, q8hr  Initiate Magnesium Replacement Protocol  Per MD Order, qDay  Initiate Potassium Replacement Protocol  Per MD Order, qDay  lisinopril 10 mg Tab  10 mg 1 tab, PO, qHS  Continuous: (1)  Sodium Chloride 0.9% 1,000 mL  1,000 mL, IV, 100 mL/hr  PRN: (2)  acetaminophen 500 mg Tab  500 mg 1 tab, PO, q4hr  ondansetron 4 mg/2 mL Vial  4 mg 2 mL, IV Push, q4hr      Vitals:    Vital Signs (last 24 hrs)_____ Last Charted___________Minimum____________ Maximum____________  Temp    98.3  (DEC 04 07:43) L 97.5 (DEC 03 20:43) 98.8  (DEC 03 16:45)  Heart Rate   72  (DEC 04 07:43) 70  (DEC 03 11:27) 81  (DEC 03 16:45)  Resp Rate       18  (DEC 04 07:43) 16  (DEC 03 11:27) 18  (DEC 03 20:43)  SBP    H 153 (DEC 04 07:43) 116  (DEC 03 11:27) H 167 (DEC 04 04:24)  DBP    75  (DEC 04 07:43) 66  (DEC 03 11:27) 84  (DEC 04 04:24)        Physical Exam:  General: alert, oriented.    History   Chief Complaint:  Abdominal Pain     The history is provided by the patient.      Dominique June is a 21 year old female with history of JASSON, abnormal pap smear of cervix, and 2 previous pregnancies who presents with abdominal pain. The patient explains that she had her first baby 2 years ago. Right after giving birth, she developed really intense stomach pain. This pain is gnawing and radiates to her back. She also reports frequent bloating. She went in to get checked out for this and had an ultrasound done which did not show any abnormalities. She got pregnant again, and her symptoms stopped. She gave birth in March, and they symptoms returned after giving birth. The abdominal pain is intermittent; there will be weeks where it'll last 3 or 4 days before going away, only to return some time later. Today was similar to previous episodes of the abdominal pain, but she also developed a sharp pain in the lower left portion of her abdomen. This is completely new for her. She has vomited and she has also passed oily diarrhea.     Review of Systems   Gastrointestinal: Positive for abdominal distention, abdominal pain, diarrhea, nausea and vomiting.   Musculoskeletal: Positive for back pain.   All other systems reviewed and are negative.    Allergies:  Medroxyprogesterone    Medications:  Wellbutrin XL  Ortho evra    Past Medical History:    Abnormal pap smear of cervix  JASSON  Indication for care in labor and delivery  Encounter for triage in pregnant patient    Social History:  Presents alone  No alcohol use    Physical Exam     Patient Vitals for the past 24 hrs:   BP Temp Temp src Pulse Resp SpO2   10/01/21 0000 131/89 -- -- 66 -- 99 %   09/30/21 2230 131/72 -- -- 70 -- 98 %   09/30/21 2215 130/67 -- -- 71 -- 99 %   09/30/21 2200 -- -- -- 73 -- 99 %   09/30/21 1845 -- 98.1  F (36.7  C) Temporal -- -- --   09/30/21 1843 (!) 131/95 -- -- 91 18 100 %       Physical Exam  Constitutional:       Comments: Pleasant  and cooperative   HENT:      Right Ear: Tympanic membrane normal.      Left Ear: Tympanic membrane normal.      Mouth/Throat:      Pharynx: No posterior oropharyngeal erythema.   Eyes:      Conjunctiva/sclera: Conjunctivae normal.   Cardiovascular:      Rate and Rhythm: Normal rate and regular rhythm.      Heart sounds: Normal heart sounds.   Pulmonary:      Effort: Pulmonary effort is normal.      Breath sounds: Normal breath sounds.   Abdominal:      General: Bowel sounds are normal. There is no distension.      Palpations: Abdomen is soft.      Tenderness: There is abdominal tenderness in the right upper quadrant and right lower quadrant.   Musculoskeletal:         General: Normal range of motion.      Cervical back: Neck supple.   Skin:     General: Skin is warm and dry.   Neurological:      Mental Status: She is alert.         Emergency Department Course       Imaging:  CT Abdomen Pelvis with IV Contrast:  1.  Mild circumferential wall thickening involving right hemicolon consistent with colitis, infectious or inflammatory etiologies possible.  2.  The appendix is minimally prominent measuring 8 mm but there is no periappendiceal inflammation. This appearance may be secondary to colonic inflammation. If there remains clinical suspicion for developing appendicitis, a follow-up CT in 24 hours may   be beneficial.  3.  There is also mild diffuse bladder wall thickening, cystitis should be considered. Kidneys appear normal.  As per radiology.    Laboratory:  BMP: Glucose 103 (H), o/w WNL (Creatinine: 0.96)  Lipase: 56 (L)  Hepatic Panel: AWNL  CBC: WBC 22.6 (H), HGB 15.2,      Labs Ordered and Resulted from Time of ED Arrival Up to the Time of Departure from the ED   ROUTINE UA WITH MICROSCOPIC REFLEX TO CULTURE - Abnormal; Notable for the following components:       Result Value    Appearance Urine Slightly Cloudy (*)     Ketones Urine 20  (*)     Protein Albumin Urine 20  (*)     Mucus Urine Present (*)   not in acute distress  Psychiatry: cooperative, normal mood and affect.   Neurology: cranial nerves grossly intact.   Eyes: pupils equal reactive to light, extraocular motion is intact. No jaundice. No palor.  HEENT: Neck supple. Oral mucosa is moist.   no Jugular venous distension. no lymphadenopathy.   Lungs: Clear to auscultation bilaterally  Heart: regular rate and rhythm, no murmurs, rubs or gallops  Abdomen: not tender, not distended, positive bowel sounds  Lower limbs: no edema. no joint swelling or tenderness.   A chaperone was present during my entire encounter with the patient today.              Labs:  Labs (Last four charted values)  WBC                  5.4 (DEC 03) H 12.1 (DEC 02)   Hgb                  11.4 (DEC 03) 12.1 (DEC 02)   Hct                  36 (DEC 03) 38 (DEC 02)   Plt                  222 (DEC 03) 262 (DEC 02)   Na                   141 (DEC 04) 140 (DEC 03) 140 (DEC 02)   K                    4.3 (DEC 04) 3.7 (DEC 03) 4.5 (DEC 02)   CO2                  21 (DEC 04) 23 (DEC 03) 23 (DEC 02)   Cl                   H 113 (DEC 04) H 110 (DEC 03) H 108 (DEC 02)  Cr                   0.76 (DEC 04) 0.85 (DEC 03) 0.99 (DEC 02)   BUN                  H 23 (DEC 04) H 21 (DEC 03) H 35 (DEC 02)   Glucose              79 (DEC 04) 84 (DEC 03) 95 (DEC 02)   Mg                   1.9 (DEC 04) 1.9 (DEC 03) 2.1 (DEC 02)   Ca                   L 7.8 (DEC 04) 8.4 (DEC 03) 8.9 (DEC 02)   Total CK             H 673 (DEC 02)        Imaging:    All imaging was reviewed by me.         Assessment and Plan:    All radiology reports and images reviewed by me.      IMPRESSION: No CT evidence for acute intracranial process. If  patient's symptoms persist or clinically indicated, MRI is recommended  to followup.        RESULTS/IMPRESSION: There is generalized osteoporosis.  No evidence  acute fracture, dislocation, or joint effusion is visualized.  Mild  soft tissue swelling is noted at the posterior elbow.      RESULTS/IMPRESSION: There is diffuse osteoporosis.  The frontal view  of pelvis and bilateral hips appears symmetric.  There is no acute  fracture or dislocation of the right hip joint or pelvis.  There is  mild DJD at the right hip joint with marginal osteophyte formation.  Early DJD is seen at the pubis symphysis and left hip joint with  marginal osteophytes formation.  There is advanced spondylosis of  lumbar sacral spine.         Medications (8) Active  Scheduled: (5)  heparin 5000 units/mL Vial  5,000 unit 1 mL, SubQ, q8hr  Initiate Magnesium Replacement Protocol  Per MD Order, qDay  Initiate Potassium Replacement Protocol  Per MD Order, qDay  lisinopril 10 mg Tab  10 mg 1 tab, PO, qDay  potassium chloride 10 mEq ER Tab  40 mEq 4 tab, PO, qDay  Continuous: (1)  Sodium Chloride 0.9% 1,000 mL  1,000 mL, IV, 100 mL/hr  PRN: (2)  acetaminophen 500 mg Tab  500 mg 1 tab, PO, q4hr  ondansetron 4 mg/2 mL Vial  4 mg 2 mL, IV Push, q4hr      All medications reviewed by me. All potential side effects and intteractions as well as adverse effects and side effects discussed with patient.     ASSESSMENT AND PLAN:  All problems below are present on admission.       RESULTS/IMPRESSION: There is generalized osteoporosis.  No evidence  acute fracture, dislocation, or joint effusion is visualized.  Mild  soft tissue swelling is noted at the posterior elbow.         RESULTS/IMPRESSION: There is diffuse osteoporosis.  The frontal view  of pelvis and bilateral hips appears symmetric.  There is no acute  fracture or dislocation of the right hip joint or pelvis.  There is  mild DJD at the right hip joint with marginal osteophyte formation.  Early DJD is seen at the pubis symphysis and left hip joint with  marginal osteophytes formation.  There is advanced spondylosis of  lumbar sacral spine.       IMPRESSION: No CT evidence for acute intracranial process. If  patient's symptoms persist or clinically indicated, MRI is recommended      Squamous Epithelials Urine 9 (*)     All other components within normal limits    Narrative:     Urine Culture not indicated   BASIC METABOLIC PANEL - Abnormal; Notable for the following components:    Glucose 103 (*)     All other components within normal limits   CBC WITH PLATELETS AND DIFFERENTIAL - Abnormal; Notable for the following components:    WBC Count 22.6 (*)     Absolute Neutrophils 17.9 (*)     Absolute Immature Granulocytes 0.1 (*)     All other components within normal limits   LIPASE - Abnormal; Notable for the following components:    Lipase 56 (*)     All other components within normal limits   HCG QUALITATIVE URINE - Normal   HEPATIC FUNCTION PANEL - Normal   IV ACCESS   CBC WITH PLATELETS & DIFFERENTIAL    Narrative:     The following orders were created for panel order CBC with platelets differential.  Procedure                               Abnormality         Status                     ---------                               -----------         ------                     CBC with platelets and d...[[id number]]  Abnormal            Final result                 Please view results for these tests on the individual orders.       HCG Qualitative Urine: negative  UA with Microscopic Reflex to Culture: slightly cloudy (A), Ketones 20 (A), Protein Albumin 20 (A), Mucus present (A), Squamous Epithelial 9 (H), o/w Negative    Emergency Department Course:    Reviewed:  I reviewed nursing notes, vitals and past medical history    Assessments:  2154 I obtained history and examined the patient as noted above.   2342 I rechecked the patient and explained findings.     Consults:   2337 I spoke on the phone with Dr. Lobato from [facility] about the patient's condition and imaging results.     Interventions:  2028 Zofran 4 mg IV  2217 Normal saline 1,000 mL IV    Disposition:  The patient was discharged to home.     Impression & Plan   CMS Diagnoses: None     Medical Decision Making:    Dominiquebrinda June is a 21 year  old female who presents to the emergency department complaining of abdominal pain.  It sounds like she has had multiple spells of this over several years.  She denies any hematochezia but does mention times of unintentional weight loss.  This presentation is suspicious for inflammatory bowel disease.  As noted I spoke with gastroenterology.  Dr. Lobato did not favor empiric steroids but thought the patient needed prompt colonoscopy.  He is taken her information will have his office call her about arranging this.  I will discharge the patient with symptomatic management.  She is feeling markedly improved after Zofran and is comfortable with outpatient management.    Covid-19  Dominique June was evaluated during a global COVID-19 pandemic, which necessitated consideration that the patient might be at risk for infection with the SARS-CoV-2 virus that causes COVID-19.   Applicable protocols for evaluation were followed during the patient's care. COVID-19 was considered as part of the patient's evaluation.     Diagnosis:    ICD-10-CM    1. Colitis  K52.9      Discharge Medications:  Discharge Medication List as of 9/30/2021 11:57 PM      START taking these medications    Details   HYDROcodone-acetaminophen (NORCO) 5-325 MG tablet Take 1-2 tablets by mouth every 6 hours as needed for severe pain, Disp-20 tablet, R-0, Local Print      ondansetron (ZOFRAN ODT) 4 MG ODT tab Take 1 tablet (4 mg) by mouth every 8 hours as needed for nausea or vomiting, Disp-20 tablet, R-0, Local Print           Scribe Disclosure:  ICami, am serving as a scribe at 9:54 PM on 9/30/2021 to document services personally performed by Delisa James MD based on my observations and the provider's statements to me.          Delisa James MD  10/01/21 0200     to followup.      All medications reviewed by me. All potential side effects and intteractions as well as adverse effects and side effects discussed with patient.     ASSESSMENT AND PLAN:  All problems below are present on admission.     Mechanical fall/deconditioning  -Patient has had a history of repeated falls multiple times due to slipping and falling  -She has mild short-term memory loss but does remember the entire event  -Monitor on telemetry  -Orthostats negative  -EKG within normal limits  -No abnormalities on telemetry  -Discussed with cardiology recommended discontinuing blood pressure medications.  -PT OT  -No recurrence of dizziness or syncopal episode during the entirety of her stay  -Patient is agreeable to rehab will be going for rehab today    Hypertension  Blood pressure elevated to 170 systolic on the 5 mg dose of lisinopril  Dose will be increased to 10 mg of lisinopril      Mild leukocytosis  Resolved without any intervention    Mild rhabdomyolysis  -She was on the ground since yesterday  -Mildly elevated IV hydration started no signs of acute kidney injury at this time    Code Status: DNR      Son can be contacted at [phone]  DVT Prophylaxis: SubQ heparin  Diet: regular   Baseline Activity: Ambulates Independently        Morning labs ordered. monitor CBC, BMP.   Management discussed with other participating services/consultants.  Discussed with decision maker who verbalized agreement with plan of care.     Please note that more than 35 minutes was required to complete this encounter, more than 50% of which was spent coordinating patient care.          Electronically Signed On 12.04.2020 10:45  ___________________________________________________   Deandre MAGDALENO, Jonathon MARLOW

## 2021-10-28 ENCOUNTER — TRANSFERRED RECORDS (OUTPATIENT)
Dept: HEALTH INFORMATION MANAGEMENT | Facility: CLINIC | Age: 21
End: 2021-10-28
Payer: COMMERCIAL

## 2021-11-27 ENCOUNTER — MYC MEDICAL ADVICE (OUTPATIENT)
Dept: INTERNAL MEDICINE | Facility: CLINIC | Age: 21
End: 2021-11-27
Payer: COMMERCIAL

## 2021-11-27 DIAGNOSIS — R11.0 NAUSEA: Primary | ICD-10-CM

## 2021-11-30 RX ORDER — ONDANSETRON 4 MG/1
4 TABLET, ORALLY DISINTEGRATING ORAL EVERY 8 HOURS PRN
Qty: 20 TABLET | Refills: 0 | Status: SHIPPED | OUTPATIENT
Start: 2021-11-30 | End: 2023-10-05

## 2021-12-21 ENCOUNTER — E-VISIT (OUTPATIENT)
Dept: URGENT CARE | Facility: URGENT CARE | Age: 21
End: 2021-12-21
Payer: COMMERCIAL

## 2021-12-21 DIAGNOSIS — Z20.822 CLOSE EXPOSURE TO 2019 NOVEL CORONAVIRUS: Primary | ICD-10-CM

## 2021-12-21 PROCEDURE — 99207 PR NO CHARGE LOS: CPT | Performed by: PHYSICIAN ASSISTANT

## 2021-12-22 ENCOUNTER — LAB (OUTPATIENT)
Dept: URGENT CARE | Facility: URGENT CARE | Age: 21
End: 2021-12-22
Attending: PHYSICIAN ASSISTANT
Payer: COMMERCIAL

## 2021-12-22 DIAGNOSIS — Z20.822 CLOSE EXPOSURE TO 2019 NOVEL CORONAVIRUS: ICD-10-CM

## 2021-12-22 PROCEDURE — U0005 INFEC AGEN DETEC AMPLI PROBE: HCPCS

## 2021-12-22 PROCEDURE — U0003 INFECTIOUS AGENT DETECTION BY NUCLEIC ACID (DNA OR RNA); SEVERE ACUTE RESPIRATORY SYNDROME CORONAVIRUS 2 (SARS-COV-2) (CORONAVIRUS DISEASE [COVID-19]), AMPLIFIED PROBE TECHNIQUE, MAKING USE OF HIGH THROUGHPUT TECHNOLOGIES AS DESCRIBED BY CMS-2020-01-R: HCPCS

## 2021-12-22 NOTE — PATIENT INSTRUCTIONS
"  Dear Dominique June,    Based on your exposure to COVID-19 (coronavirus), we would like to test you for this virus. I have placed an order for this test.The best time for testing is 5-7 days after the exposure.    How to schedule:  Go to your [facility] home page and scroll down to the section that says  You have an appointment that needs to be scheduled  and click the large green button that says  Schedule Now  and follow the steps to find the next available opening.     If you are unable to complete these [facility] scheduling steps, please call [phone] to schedule your testing.     Return to work/school/ guidance:   For people with high risk exposures outside the home    Please let your workplace manager and staffing office know when your quarantine ends.     We can not give you an exact date as it depends on the information below. You can calculate this on your own or work with your manager/staffing office to calculate this. (For example if you were exposed on 10/4, you would have to quarantine for 14 full days. That would be through 10/18. You could return on 10/19.)    Quarantine Guidelines:  Patients (\"contacts\") who have been in close prolonged contact of an infected person(s) (within six feet for at least 15 minutes within a 24 hour period), and remain asymptomatic should enter quarantine based on the following options:    14-day quarantine period (this remains the CDC recommendation for the greatest protection against spread of COVID-19) OR    Minimum 7-day quarantine with negative RT-PCR test collected on day 5 or later OR    10-day quarantine with no test  Quarantine Guideline exceptions are as follows:    People who have been fully vaccinated do not need to quarantine if the exposure was at least 2 weeks after the last vaccination. This includes vaccinated health care workers.    Not fully vaccinated and unvaccinated Individuals who work in health care, congregate care, or congregate living " should be off work for 14 days from their last date of exposure. Community activities for this group can be resumed based on options above. Fully vaccinated individuals in this group do not need to quarantine from work after exposure.    Not fully vaccinated and unvaccinated people whose high-risk exposure was a household member should always quarantine for 14 days from their last date of exposure. Fully vaccinated people in this category do not need to quarantine.    Not fully vaccinated or unvaccinated residents of congregate care and congregate living settings should always quarantine for 14 days from their last date of exposure. Fully vaccinated residents do not need to quarantine.  Note: If you have ongoing exposure to the covid positive person, this quarantine period may be more than 14 days. (For example, if you are continued to be exposed to your child who tested positive and cannot isolate from them, then the quarantine of 7-14 days can't start until your child is no longer contagious. This is typically 10 days from onset of the child's symptoms. So the total duration may be 17-24 days in this case.)    You should continue symptom monitoring until day 14 post-exposure. If you develop signs or symptoms of COVID-19, isolate and get tested (even if you have been tested already).    How to quarantine:   Stay home and away from others. Don't go to school or anywhere else. Generally quarantine means staying home from work but there are some exceptions to this. Please contact your workplace.  No hugging, kissing or shaking hands.  Don't let anyone visit.  Cover your mouth and nose with a mask, tissue or washcloth to avoid spreading germs.  Wash your hands and face often. Use soap and water.    What are the symptoms of COVID-19?  The most common symptoms are cough, fever and trouble breathing. Less common symptoms include headache, body aches, fatigue (feeling very tired), chills, sore throat, stuffy or runny nose,  diarrhea (loose poop), loss of taste or smell, belly pain, and nausea or vomiting (feeling sick to your stomach or throwing up).  After 14 days, if you have still don't have symptoms, you likely don't have this virus.  If you develop symptoms, follow these guidelines.  If you're normally healthy: Please start another eVisit.  If you have a serious health problem (like cancer, heart failure, an organ transplant or kidney disease): Call your specialty clinic. Let them know that you might have COVID-19.    Where can I get more information?  [facility] - About COVID-19: [email or web address]  CDC - What to Do If You're Sick: www.cdc.gov/coronavirus/2019-ncov/about/steps-when-sick.html  CDC - Ending Home Isolation: www.cdc.gov/coronavirus/2019-ncov/hcp/disposition-in-home-patients.html  CDC - Caring for Someone: www.cdc.gov/coronavirus/2019-ncov/if-you-are-sick/care-for-someone.html  [facility] clinical trials (COVID-19 research studies): clinicalaffairs.[facility]-clinical-trials  Below are the COVID-19 hotlines at the [place] [facility] ([facility]). Interpreters are available.  For health questions: Call 098-307-3152 or 1-150.495.5841 (7 a.m. to 7 p.m.)  For questions about schools and childcare: Call 439-109-8954 or 1-983.517.6345 (7 a.m. to 7 p.m.)        December 21, 2021  RE:  Dominique June                                                                                                                   [address] [address]      To whom it may concern:    I evaluated Dominique June on December 21, 2021. Dominique June should be excused from work/school.    They should let their workplace manager and staffing office know when their quarantine ends.    We can not give an exact date as it depends on the information below. They can calculate this on their own or work with their manager/staffing office to calculate this. (For example if they were exposed  "on 10/04, they would have to quarantine for 14 full days. That would be through 10/18. They could return on 10/19.)    Quarantine Guidelines:    Patients (\"contacts\") who have been in close prolonged contact of an infected person(s) (within six feet for at least 15 minutes within a 24 hour period) and remain asymptomatic should enter quarantine based on the following options:      14-day quarantine period (this remains the CDC recommendation for the greatest protection against spread of COVID-19) OR    Minimum 7-day quarantine with negative RT-PCR test collected on day 5 or later OR    10-day quarantine with no test   Quarantine Guideline exceptions are as follows:    People who have been fully vaccinated do not need to quarantine if the exposure was at least 2 weeks after the last vaccination. This includes vaccinated health care workers.    Not fully vaccinated and unvaccinated Individuals who work in health care, congregate care, or congregate living should be off work for 14 days from their last date of exposure. Community activities for this group can be resumed based on options above. Fully vaccinated individuals in this group do not need to quarantine from work after exposure.    Not fully vaccinated and unvaccinated people whose high-risk exposure was a household member should always quarantine for 14 days from their last date of exposure. Fully vaccinated people in this category do not need to quarantine.    Not fully vaccinated or unvaccinated residents of congregate care and congregate living settings should always quarantine for 14 days from their last date of exposure. Fully vaccinated residents do not need to quarantine.    Note: If there is ongoing exposure to the covid positive person, this quarantine period may be longer than 14 days. (For example, if they are continually exposed to their child, who tested positive and cannot isolate from them, then the quarantine of 7-14 days can't start until " their child is no longer contagious. This is typically 10 days from onset to the child's symptoms. So the total duration may be 17-24 days in this case.)    Dominique June should continue symptom monitoring until day 14 post-exposure. If they develop signs or symptoms of COVID-19, they should isolate and get tested (even if they have been tested already).    Sincerely,  Tomi Mensah PA-C

## 2021-12-23 LAB — SARS-COV-2 RNA RESP QL NAA+PROBE: NEGATIVE

## 2022-01-09 ENCOUNTER — E-VISIT (OUTPATIENT)
Dept: URGENT CARE | Facility: CLINIC | Age: 22
End: 2022-01-09
Payer: COMMERCIAL

## 2022-01-09 DIAGNOSIS — N39.0 ACUTE UTI (URINARY TRACT INFECTION): Primary | ICD-10-CM

## 2022-01-09 PROCEDURE — 99207 PR NO CHARGE LOS: CPT | Performed by: NURSE PRACTITIONER

## 2022-01-09 RX ORDER — NITROFURANTOIN 25; 75 MG/1; MG/1
100 CAPSULE ORAL 2 TIMES DAILY
Qty: 10 CAPSULE | Refills: 0 | Status: SHIPPED | OUTPATIENT
Start: 2022-01-09 | End: 2022-01-14

## 2022-01-09 NOTE — PATIENT INSTRUCTIONS
Dear Dominique June    After reviewing your responses, I've been able to diagnose you with a urinary tract infection, which is a common infection of the bladder with bacteria.  This is not a sexually transmitted infection, though urinating immediately after intercourse can help prevent infections.  Drinking lots of fluids is also helpful to clear your current infection and prevent the next one.      I have sent a prescription for antibiotics to your pharmacy to treat this infection.    It is important that you take all of your prescribed medication even if your symptoms are improving after a few doses.  Taking all of your medicine helps prevent the symptoms from returning.     If your symptoms worsen, you develop pain in your back or stomach, develop fevers, or are not improving in 5 days, please contact your primary care provider for an appointment or visit any of our convenient Walk-in or Urgent Care Centers to be seen, which can be found on our website here.    Thanks again for choosing us as your health care partner,    EMEKA Caal CNP    Urinary Tract Infections in Women  Urinary tract infections (UTIs) are most often caused by bacteria. These bacteria enter the urinary tract. The bacteria may come from inside the body. Or they may travel from the skin outside the rectum or vagina into the urethra. Female anatomy makes it easy for bacteria from the bowel to enter a woman s urinary tract, which is the most common source of UTI. This means women develop UTIs more often than men. Pain in or around the urinary tract is a common UTI symptom. But the only way to know for sure if you have a UTI for the healthcare provider to test your urine. The two tests that may be done are the urinalysis and urine culture.     Types of UTIs    Cystitis. A bladder infection (cystitis) is the most common UTI in women. You may have urgent or frequent need to pee. You may also have pain, burning when you pee, and bloody  urine.    Urethritis. This is an inflamed urethra, which is the tube that carries urine from the bladder to outside the body. You may have lower stomach or back pain. You may also have urgent or frequent need to pee.    Pyelonephritis. This is a kidney infection. If not treated, it can be serious and damage your kidneys. In severe cases, you may need to stay in the hospital. You may have a fever and lower back pain.    Medicines to treat a UTI  Most UTIs are treated with antibiotics. These kill the bacteria. The length of time you need to take them depends on the type of infection. It may be as short as 3 days. If you have repeated UTIs, you may need a low-dose antibiotic for several months. Take antibiotics exactly as directed. Don t stop taking them until all of the medicine is gone. If you stop taking the antibiotic too soon, the infection may not go away. You may also develop a resistance to the antibiotic. This can make it much harder to treat.   Lifestyle changes to treat and prevent UTIs   The lifestyle changes below will help get rid of your UTI. They may also help prevent future UTIs.     Drink plenty of fluids. This includes water, juice, or other caffeine-free drinks. Fluids help flush bacteria out of your body.    Empty your bladder. Always empty your bladder when you feel the urge to pee. And always pee before going to sleep. Urine that stays in your bladder can lead to infection. Try to pee before and after sex as well.    Practice good personal hygiene. Wipe yourself from front to back after using the toilet. This helps keep bacteria from getting into the urethra.    Use condoms during sex. These help prevent UTIs caused by sexually transmitted bacteria. Also don't use spermicides during sex. These can increase the risk for UTIs. Choose other forms of birth control instead. For women who tend to get UTIs after sex, a low-dose of a preventive antibiotic may be used. Be sure to discuss this option with  your healthcare provider.    Follow up with your healthcare provider as directed. He or she may test to make sure the infection has cleared. If needed, more treatment may be started.  Horacio last reviewed this educational content on 7/1/2019 2000-2021 [employer]. All rights reserved. This information is not intended as a substitute for professional medical care. Always follow your healthcare professional's instructions.

## 2022-04-18 ENCOUNTER — E-VISIT (OUTPATIENT)
Dept: URGENT CARE | Facility: CLINIC | Age: 22
End: 2022-04-18
Payer: COMMERCIAL

## 2022-04-18 DIAGNOSIS — N89.8 VAGINAL DISCHARGE: Primary | ICD-10-CM

## 2022-04-18 PROCEDURE — 99207 PR NON-BILLABLE SERV PER CHARTING: CPT | Performed by: FAMILY MEDICINE

## 2022-04-18 NOTE — PATIENT INSTRUCTIONS
Dear Dominique June,    We are sorry you are not feeling well. Based on the responses you provided, it is recommended that you be seen in-person in urgent care so we can better evaluate your symptoms. Please click here to find the nearest urgent care location to you.   You will not be charged for this Visit. Thank you for trusting us with your care.    Shea Mares MD

## 2022-04-27 ENCOUNTER — PATIENT OUTREACH (OUTPATIENT)
Dept: OBGYN | Facility: CLINIC | Age: 22
End: 2022-04-27
Payer: COMMERCIAL

## 2022-04-27 PROBLEM — R87.619 ABNORMAL PAP SMEAR OF CERVIX: Status: ACTIVE | Noted: 2019-01-01

## 2022-06-11 ENCOUNTER — HEALTH MAINTENANCE LETTER (OUTPATIENT)
Age: 22
End: 2022-06-11

## 2022-06-24 DIAGNOSIS — Z30.016 ENCOUNTER FOR INITIAL PRESCRIPTION OF TRANSDERMAL PATCH HORMONAL CONTRACEPTIVE DEVICE: ICD-10-CM

## 2022-06-24 RX ORDER — NORELGESTROMIN AND ETHINYL ESTRADIOL 150; 35 UG/D; UG/D
PATCH TRANSDERMAL
Qty: 9 PATCH | Refills: 0 | Status: SHIPPED | OUTPATIENT
Start: 2022-06-24 | End: 2022-08-15

## 2022-06-24 NOTE — TELEPHONE ENCOUNTER
"Requested Prescriptions   Pending Prescriptions Disp Refills     XULANE 150-35 MCG/24HR patch [Pharmacy Med Name: XULANE PATCHES] 9 patch 3     Sig: REMOVE OLD PATCH AND APPLY NEW PATCH ONTO SKIN ONCE A WEEK FOR 3 WEEKS DO NOT WEAR PATCH WEEK 4( DAYS 22-28) THEN REPEAT       Contraceptives Protocol Passed - 6/24/2022  3:35 AM        Passed - Patient is not a current smoker if age is 35 or older        Passed - Recent (12 mo) or future (30 days) visit within the authorizing provider's specialty     Patient has had an office visit with the authorizing provider or a provider within the authorizing providers department within the previous 12 mos or has a future within next 30 days. See \"Patient Info\" tab in inbasket, or \"Choose Columns\" in Meds & Orders section of the refill encounter.              Passed - Medication is active on med list        Passed - No active pregnancy on record        Passed - No positive pregnancy test in past 12 months           3 months sent, then needs appt.    Irma KELLY RN BSN        "

## 2022-07-19 ENCOUNTER — MYC MEDICAL ADVICE (OUTPATIENT)
Dept: INTERNAL MEDICINE | Facility: CLINIC | Age: 22
End: 2022-07-19

## 2022-07-19 DIAGNOSIS — Z13.39 ADHD (ATTENTION DEFICIT HYPERACTIVITY DISORDER) EVALUATION: ICD-10-CM

## 2022-07-19 DIAGNOSIS — F41.9 ANXIETY: Primary | ICD-10-CM

## 2022-07-28 ENCOUNTER — TELEPHONE (OUTPATIENT)
Dept: BEHAVIORAL HEALTH | Facility: CLINIC | Age: 22
End: 2022-07-28

## 2022-08-02 ENCOUNTER — PATIENT OUTREACH (OUTPATIENT)
Dept: BEHAVIORAL HEALTH | Facility: CLINIC | Age: 22
End: 2022-08-02

## 2022-08-02 ENCOUNTER — TELEPHONE (OUTPATIENT)
Dept: BEHAVIORAL HEALTH | Facility: CLINIC | Age: 22
End: 2022-08-02

## 2022-08-02 NOTE — TELEPHONE ENCOUNTER
[facility]  Behavioral Health Clinician Triage Service    [facility] Respons[facility]:  [facility] Follow[facility] to [facility] 7/30/2022   PHQ-9 9. Suicide Ideation past 2 weeks Several days   Thoughts of suicide or self harm in past 2 weeks Yes   Thoughts of suicide or self harm in past 2 weeks Yes   PHQ-9 Self harm plan? No   PHQ-9 Self harm action? No   PHQ-9 Safety concerns? No   PHQ-9 Self harm plan? No   PHQ-9 Self harm action? No   PHQ-9 Safety concerns? No        1st Outreach Date: August 2, 2022 Time: 842am  Outcome: Completed phone conversation / triage service.  See assessment and disposition below.  2nd Outreach Date: August 2, 2022 Time: NA  Outcome: NA      Hi my name is Nadia Go  I m calling from [email or web address] to follow-up on a questionnaire you completed on [employer].      If it s ok I d like review a few of your symptoms/responses and a talk briefly  about how you have been doing to see if I might be able to provide some support and guidance.       Address/Location of patient: [address] [place] [address]  Have any supportive people near them? yes, mother and brother live in [place]    Risk Assessment:  Patient denies a history of suicidal ideation, suicide attempts, self-injurious behavior, homicidal ideation, homicidal behavior and and other safety concerns  Patient denies current or recent suicidal ideation or behaviors.  Patient denies current or recent homicidal ideation or behaviors.  Patient denies current or recent self injurious behavior or ideation.  Patient denies other safety concerns.  Patient reports there are no firearms in the house  Protective Factors Children in the home , Positive problem-solving skills and Positive social support   Risk Factors Isolation    ASQ Assessment:  1. In the past few weeks, have you wished you were dead?  Yes: States that her  is frequently gone from home for work. She's alone with their 2 small children. States that she sometimes  gets overwhelmed and wants to not be here. Her support system includes her father and 2 brothers who will come and help her. Denies any intent, plan or concerns for safety. She is currently on psychiatric medication but thinks she needs a med adjustment. She has an assessment on 8/4/2022.   2. In the past few weeks, have you felt that you or your family would be better off if you         were dead?  Yes: gets overwhelmed  3. In the past week, have you been having thoughts about killing yourself?  No  4. Have you ever tried to kill yourself?  No    Disposition:    - Recommendations / Safety Plan: A safety and risk management plan has not been developed at this time, however patient was encouraged to call [facility] / 911 should there be a change in any of these risk factors. Pt was sent crisis resources, encouraged to use her support system and to keep her appointment on 8/4.    Nadia Go [facility]

## 2022-08-04 ENCOUNTER — HOSPITAL ENCOUNTER (OUTPATIENT)
Dept: BEHAVIORAL HEALTH | Facility: CLINIC | Age: 22
Discharge: HOME | End: 2022-08-04
Attending: FAMILY MEDICINE
Payer: COMMERCIAL

## 2022-08-04 PROCEDURE — 999N000216 HC STATISTIC ADULT CD FACE TO FACE-NO CHRG: Mod: GT,95 | Performed by: COUNSELOR

## 2022-08-04 ASSESSMENT — COLUMBIA-SUICIDE SEVERITY RATING SCALE - C-SSRS
1. IN YOUR LIFETIME, HAVE YOU WISHED YOU WERE DEAD OR WISHED YOU COULD GO TO SLEEP AND NOT WAKE UP?: PASSIVE SI
TOTAL  NUMBER OF INTERRUPTED ATTEMPTS LIFETIME: NO
6. HAVE YOU EVER DONE ANYTHING, STARTED TO DO ANYTHING, OR PREPARED TO DO ANYTHING TO END YOUR LIFE?: NO
ATTEMPT LIFETIME: NO
1. IN THE PAST MONTH, HAVE YOU WISHED YOU WERE DEAD OR WISHED YOU COULD GO TO SLEEP AND NOT WAKE UP?: PASSIVE SI
1. IN THE PAST MONTH, HAVE YOU WISHED YOU WERE DEAD OR WISHED YOU COULD GO TO SLEEP AND NOT WAKE UP?: YES
TOTAL  NUMBER OF ABORTED OR SELF INTERRUPTED ATTEMPTS LIFETIME: NO
1. HAVE YOU WISHED YOU WERE DEAD OR WISHED YOU COULD GO TO SLEEP AND NOT WAKE UP?: YES
2. HAVE YOU ACTUALLY HAD ANY THOUGHTS OF KILLING YOURSELF?: NO

## 2022-08-04 ASSESSMENT — PATIENT HEALTH QUESTIONNAIRE - PHQ9
SUM OF ALL RESPONSES TO PHQ QUESTIONS 1-9: 18
10. IF YOU CHECKED OFF ANY PROBLEMS, HOW DIFFICULT HAVE THESE PROBLEMS MADE IT FOR YOU TO DO YOUR WORK, TAKE CARE OF THINGS AT HOME, OR GET ALONG WITH OTHER PEOPLE: VERY DIFFICULT
SUM OF ALL RESPONSES TO PHQ QUESTIONS 1-9: 18

## 2022-08-04 NOTE — PROGRESS NOTES
[facility]    ADULT Mental Health Assessment Appointment Note    PATIENT'S NAME:  Dominique June    PREFERRED NAME: Dominique  MRN: [id number]  YOB: 2000  Address:  [address] [place] [id number]  PREFERRED PHONE: [phone]  May we leave a referral related message: Yes  EMAIL: [email or web address]  DATE OF SERVICE: 22  START TIME:  1300  END TIME: 1320  SERVICE MODALITY:  Video Visit:      Provider verified identity through the following two step process.  Patient provided:  Patient  and Patient address    Telemedicine Visit: The patient's condition can be safely assessed and treated via synchronous audio and visual telemedicine encounter.      Reason for Telemedicine Visit: Patient has requested telehealth visit    Originating Site (Patient Location): Patient's home    Distant Site (Provider Location): Provider Remote Setting- Home Office    Consent:  The patient/guardian has verbally consented to: the potential risks and benefits of telemedicine (video visit) versus in person care; bill my insurance or make self-payment for services provided; and responsibility for payment of non-covered services.     Patient would like the video invitation sent by:  My Chart    Mode of Communication:  Video Conference via [employer]    As the provider I attest to compliance with applicable laws and regulations related to telemedicine.    Identifying Information:  Patient is a 22 year old,  female.  Patient was referred for an assessment by self.  Patient attended the session alone. Patient identified their preferred language to be English. Patient reported they luna not need the assistance of an  or other support involved in therapy.     The patient s risk to self and others was assessed in the risks and follow up section of this document.    Chief Complaint:  The reason for seeking services at this time is: The  "reason for seeking services at this time is: \"Increase in anxiety, meds no longer working. and possible bpd, as it runs in maternal family\".  The problem(s) began 6/15/2022.     PHQ9 scores indicate history of passive suicidal ideation, she reports she denies intent or plan, but sometimes wonders if it would be easier for her to not be here. No history of hospitalizations, no history of suicide attempts or self injurious behaviors.  Patient reports that she has trialed several medication with her PCP and has been referred to psychiatry. Patient is also interested in working with an individual therapist.    Patient has attempted to resolve these concerns in the past through Medication management with PCP.    Mental Health: Review of Symptoms per patient report:  Patient reports a historical problem with both depression and anxiety, she started medication in high school. She noticed an increase in severity and frequency after the birth of her daughter. 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 Limited support in the area. Patient has noticed that she is having a difficult time controlling her anger when she is overwhelmed, she feels like this is affecting her parentings.    Substance Use:  Do you  have a history of alcohol or illicit drug use?   CAGE-AID:   CAGE-AID Total Score 7/30/2022   Total Score 1   Total Score MyChart 1 (A total score of 2 or greater is considered clinically significant)     Based on the negative CAGE score and clinical interview there are not indications of drug or alcohol abuse. There are not recommendations for structured treatment or community support programming at present. A problematic pattern of alcohol/drug use leading to clinically significant impairment or distress, as manifested by at least two of the following, occurring within a 12-month period: (1) Substance is often taken in larger amounts or over a longer period than was " intended. (2) There is persistent desire or unsuccessful efforts to cut down or control use of the substance.  (3) A great deal of time is spent in activities necessary to obtain the substance, use the substance, or recover from its effects. (4) Craving, or a strong desire or urge to use the substance. (5) Recurrent use of the substance resulting in a failure to fulfill major role obligations at work, school, or home. (6) Continued use of the substance despite having persistent or recurrent social or interpersonal problems caused or exacerbated by the effects of its use. (7)  Important social, occupational, or recreational activities are given up or reduced because of the substance. (8) Recurrent use of the substance in which it is physically hazardous. (9) Use of the substance is continued despite knowledge of having a persistent or recurrent physical or psychological problem that is likely to have been cause or exacerbated by the substance.(10) Tolerance:  either a need for markedly increased amounts of the substance to achieve the desired effect or a markedly diminished effect with continued use of the dame amount of the substance.  The patient does not currently identity positively with any of the 11 DSM-5 criteria for a diagnostic impression of having a substance use disorder. If problematic use begins/returns, patient should be seen for an updated WALLY assessment to determine if they meet criteria for any level of WALLY programming. There are not recommendations for structured treatment or community support programming at present. Diagnostic assessment for substance use disorder completed.     Significant Losses / Trauma / Abuse / Neglect Issues:   Patient did not serve in the .    Medical History:  Patient reports the following medication history:   Past Medical History:   Diagnosis Date     Abnormal Pap smear of cervix 01/01/2019    Pt reported abnormal pap in 2019 in [place] during pregnancy. Told  to repeat pap after delivery but never did. (copied and pasted from 5/11/21 visit notes) 5/11/21 NIL pap     JASSON (generalized anxiety disorder) 01/22/2021     Dominique June reports taking the following medications:  Current Outpatient Medications   Medication     buPROPion (WELLBUTRIN XL) 300 MG 24 hr tablet     HYDROcodone-acetaminophen (NORCO) 5-325 MG tablet     ondansetron (ZOFRAN ODT) 4 MG ODT tab     XULANE 150-35 MCG/24HR patch     No current facility-administered medications for this encounter.     Patient has some GI issues-she is being evaluated by medicine.  There are significant appetite / nutritional concerns / weight changes.Patient reports a weight loss of about 45 pounds, believes it has something to do with ongoing GI issues.  Patient does not report a history of head injury / trauma / cognitive impairment.      Current Mental Status Exam:   Appearance:  Appropriate    Eye Contact:  Good   Psychomotor:  Normal   Attitude / Demeanor: Cooperative  Friendly Pleasant  Speech Rate:  Normal/ Responsive  Volume:  Normal  volume  Language:  intact  Mood:   Normal  Affect:   Appropriate    Thought Content: Clear   Thought Process: Goal Directed  Logical     Associations:  No loosening of associations  Insight:   Good   Judgment:  Intact   Orientation:  All  Attention:  Good    Rating Scales:  PHQ9:    PHQ-9 SCORE 7/30/2022 8/4/2022   PHQ-9 Total Score MyChart 19 (Moderately severe depression) 18 (Moderately severe depression)   PHQ-9 Total Score 19 18   ;    GAD7:    JASSON-7 SCORE 8/6/2021 8/31/2021 7/30/2022   Total Score - - 21 (severe anxiety)   Total Score 5 4 21     Safety Assessment:   Current Safety Concerns:[place] Suicide Severity Rating Scale (Lifetime/Recent)  [place] Suicide Severity Rating (Lifetime/Recent) 3/27/2021 3/27/2021 8/4/2022   Q1 Wished to be Dead (Past Month) no no -   Q2 Suicidal Thoughts (Past Month) no no -   Q3 Suicidal Thought Method no - -   Q4 Suicidal Intent without Specific  Plan no - -   Q5 Suicide Intent with Specific Plan no - -   Q6 Suicide Behavior (Lifetime) no - -   1. Wish to be Dead (Lifetime) - - 1   Wish to be Dead Description (Lifetime) - - Passive SI   1. Wish to be Dead (Past 1 Month) - - 1   Wish to be Dead Description (Past 1 Month) - - Passive SI   2. Non-Specific Active Suicidal Thoughts (Lifetime) - - 0   Actual Attempt (Lifetime) - - 0   Has subject engaged in non-suicidal self-injurious behavior? (Lifetime) - - 0   Interrupted Attempts (Lifetime) - - 0   Aborted or Self-Interrupted Attempt (Lifetime) - - 0   Preparatory Acts or Behavior (Lifetime) - - 0   Calculated C-SSRS Risk Score (Lifetime/Recent) - - Low Risk     [place] Suicide Severity Rating Scale (Short Version)  [place] Suicide Severity Rating (Short Version) 3/13/2021 3/27/2021 3/27/2021 9/30/2021   Over the past 2 weeks have you felt down, depressed, or hopeless? no no no no   Over the past 2 weeks have you had thoughts of killing yourself? no no no no   Have you ever attempted to kill yourself? no no no no   Q1 Wished to be Dead (Past Month) - no no -   Q2 Suicidal Thoughts (Past Month) - no no -   Q3 Suicidal Thought Method - no - -   Q4 Suicidal Intent without Specific Plan - no - -   Q5 Suicide Intent with Specific Plan - no - -   Q6 Suicide Behavior (Lifetime) - no - -     Functional Status:  PROMIS 10-Global Health (all questions and answers displayed):   PROMIS 10 7/30/2022   In general, would you say your health is: Poor   In general, would you say your quality of life is: Poor   In general, how would you rate your physical health? Poor   In general, how would you rate your mental health, including your mood and your ability to think? Poor   In general, how would you rate your satisfaction with your social activities and relationships? Poor   In general, please rate how well you carry out your usual social activities and roles Fair   To what extent are you able to carry out your everyday  physical activities such as walking, climbing stairs, carrying groceries, or moving a chair? Mostly   How often have you been bothered by emotional problems such as feeling anxious, depressed or irritable? Always   How would you rate your fatigue on average? Moderate   How would you rate your pain on average?   0 = No Pain  to  10 = Worst Imaginable Pain 4   In general, would you say your health is: 1   In general, would you say your quality of life is: 1   In general, how would you rate your physical health? 1   In general, how would you rate your mental health, including your mood and your ability to think? 1   In general, how would you rate your satisfaction with your social activities and relationships? 1   In general, please rate how well you carry out your usual social activities and roles. (This includes activities at home, at work and in your community, and responsibilities as a parent, child, spouse, employee, friend, etc.) 2   To what extent are you able to carry out your everyday physical activities such as walking, climbing stairs, carrying groceries, or moving a chair? 4   In the past 7 days, how often have you been bothered by emotional problems such as feeling anxious, depressed, or irritable? 5   In the past 7 days, how would you rate your fatigue on average? 3   In the past 7 days, how would you rate your pain on average, where 0 means no pain, and 10 means worst imaginable pain? 4   Global Mental Health Score 4   Global Physical Health Score 11   PROMIS TOTAL - SUBSCORES 15   Some recent data might be hidden     Clinical Impressions:  Major Depressive Disorder-  A) Recurrent episode(s) - symptoms have been present during the same 2-week period and represent a change from previous functioning 5 or more symptoms (required for diagnosis)   - Depressed mood. Note: In children and adolescents, can be irritable mood.     - Diminished interest or pleasure in all, or almost all, activities.     - Change in  sleep pattern.   - Fatigue or loss of energy.    - Feelings of worthlessness or inappropriate and excessive guilt.    - Diminished ability to think or concentrate, or indecisiveness.    - Recurrent thoughts of death (not just fear of dying), recurrent suicidal ideation without a specific plan, or a suicide attempt or a specific plan for committing suicide.   B) The symptoms cause clinically significant distress or impairment in social, occupational, or other important areas of functioning  C) The episode is not attributable to the physiological effects of a substance or to another medical condition  D) The occurrence of major depressive episode is not better explained by other thought / psychotic disorders  E) There has never been a manic episode or hypomanic episode    Generalized Anxiety Disorder-  A. Excessive anxiety and worry about a number of events or activities (such as work or school performance).   B. The person finds it difficult to control the worry.   - Restlessness or feeling keyed up or on edge.    - Being easily fatigued.    - Difficulty concentrating or mind going blank.    - Irritability.    - Muscle tension.    - Sleep disturbance (difficulty falling or staying asleep, or restless unsatisfying sleep).   D. The focus of the anxiety and worry is not confined to features of an Axis I disorder.  E. The anxiety, worry, or physical symptoms cause clinically significant distress or impairment in social, occupational, or other important areas of functioning.   F. The disturbance is not due to the direct physiological effects of a substance (e.g., a drug of abuse, a medication) or a general medical condition (e.g., hyperthyroidism) and does not occur exclusively during a Mood Disorder, a Psychotic Disorder, or a Pervasive Developmental Disorder.     Therapeutic Interventions Provided: {Assessment and intervention included meeting with patient, and review of EPIC notes. Psychotherapy techniques utilized  include risk and safety assessment, establishing rapport, active and empathic listening, and validation of feelings and experiences.     Recommendations/Plan: The patient's acute suicide risk was determined to be low due to the following factors: Denial of suicidal thoughts, no history of suicide attempts. Patient is not currently under the influence of alcohol or illicit substances, denies experiencing command hallucinations, and no immediate access to firearms. The patient's acute risk could be higher if noncompliant with their treatment plan, medications, follow-up appointments or using illicit substances or alcohol. Protective factors include: Family, children, future focused.    Patient does not require the level of care an ADT would offer her. She has tried several medication with primary care and has now been referred to psychiatry. Patient endorses symptoms of depression and anxiety. She has historically dealt with these symptoms. Recommendations include medication management and individual therapy. A full diagnostic assessment was not necessary as we were able to secure appointments in the community for next week. Patient to address possible BDP with individual therapist.  Appointment information was sent to the patient via [employer], she was encouraged to come back if we do not see improvement with medication and therapy. Patient in agreement with plan.     Referrals:   Medication management intake:  Date: Monday, 9/12/2022  Time: 9:00 am - 10:00 am  Provider: Magalys Sanchez MD, MD  Location: [facility], [address] [place] [address], [facility] [address] [address]  Phone: [phone]  Type: Telepsychiatry    Individual therapy intake:  Date: Tuesday, 8/9/2022  Time: 4:00 pm - 5:00 pm  Provider: Linda Aguirre  Location: [facility], [address] [address] [facility] [address] [address]  Phone: [phone]  Type: Teletherapy    Reyna Lott MA, LPCC, LADC   August 4, 2022  Licensed Psychotherapist  [facility]             Answers for HPI/ROS submitted by the patient on 8/4/2022  If you checked off any problems, how difficult have these problems made it for you to do your work, take care of things at home, or get along with other people?: Very difficult  PHQ9 TOTAL SCORE: 18

## 2022-08-14 DIAGNOSIS — Z30.016 ENCOUNTER FOR INITIAL PRESCRIPTION OF TRANSDERMAL PATCH HORMONAL CONTRACEPTIVE DEVICE: ICD-10-CM

## 2022-08-15 RX ORDER — NORELGESTROMIN AND ETHINYL ESTRADIOL 150; 35 UG/D; UG/D
PATCH TRANSDERMAL
Qty: 9 PATCH | Refills: 0 | Status: SHIPPED | OUTPATIENT
Start: 2022-08-15 | End: 2022-11-07 | Stop reason: ALTCHOICE
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
## 2022-08-16 ENCOUNTER — VIRTUAL VISIT (OUTPATIENT)
Dept: PSYCHOLOGY | Facility: CLINIC | Age: 22
End: 2022-08-16
Attending: COUNSELOR
Payer: COMMERCIAL

## 2022-08-16 DIAGNOSIS — F41.1 GAD (GENERALIZED ANXIETY DISORDER): ICD-10-CM

## 2022-08-16 PROCEDURE — 90791 PSYCH DIAGNOSTIC EVALUATION: CPT | Mod: 95 | Performed by: PSYCHOLOGIST

## 2022-08-16 ASSESSMENT — ANXIETY QUESTIONNAIRES
6. BECOMING EASILY ANNOYED OR IRRITABLE: NEARLY EVERY DAY
7. FEELING AFRAID AS IF SOMETHING AWFUL MIGHT HAPPEN: NEARLY EVERY DAY
4. TROUBLE RELAXING: SEVERAL DAYS
GAD7 TOTAL SCORE: 18
5. BEING SO RESTLESS THAT IT IS HARD TO SIT STILL: MORE THAN HALF THE DAYS
1. FEELING NERVOUS, ANXIOUS, OR ON EDGE: NEARLY EVERY DAY
GAD7 TOTAL SCORE: 18
2. NOT BEING ABLE TO STOP OR CONTROL WORRYING: NEARLY EVERY DAY
8. IF YOU CHECKED OFF ANY PROBLEMS, HOW DIFFICULT HAVE THESE MADE IT FOR YOU TO DO YOUR WORK, TAKE CARE OF THINGS AT HOME, OR GET ALONG WITH OTHER PEOPLE?: VERY DIFFICULT
3. WORRYING TOO MUCH ABOUT DIFFERENT THINGS: NEARLY EVERY DAY
7. FEELING AFRAID AS IF SOMETHING AWFUL MIGHT HAPPEN: NEARLY EVERY DAY
GAD7 TOTAL SCORE: 18
IF YOU CHECKED OFF ANY PROBLEMS ON THIS QUESTIONNAIRE, HOW DIFFICULT HAVE THESE PROBLEMS MADE IT FOR YOU TO DO YOUR WORK, TAKE CARE OF THINGS AT HOME, OR GET ALONG WITH OTHER PEOPLE: VERY DIFFICULT

## 2022-08-29 ENCOUNTER — OFFICE VISIT (OUTPATIENT)
Dept: MIDWIFE SERVICES | Facility: CLINIC | Age: 22
End: 2022-08-29
Payer: COMMERCIAL

## 2022-08-29 VITALS — WEIGHT: 121 LBS | DIASTOLIC BLOOD PRESSURE: 72 MMHG | SYSTOLIC BLOOD PRESSURE: 126 MMHG | BODY MASS INDEX: 20.77 KG/M2

## 2022-08-29 DIAGNOSIS — Z30.09 COUNSELING FOR BIRTH CONTROL REGARDING INTRAUTERINE DEVICE (IUD): Primary | ICD-10-CM

## 2022-08-29 PROCEDURE — 99213 OFFICE O/P EST LOW 20 MIN: CPT | Performed by: REGISTERED NURSE

## 2022-08-29 NOTE — NURSING NOTE
"Chief Complaint   Patient presents with     Physical     IUD consult      Pap due but c/o heavy bleeding with period.    Initial /72 (BP Location: Left arm, Cuff Size: Adult Regular)   Wt 54.9 kg (121 lb)   LMP 2022   BMI 20.77 kg/m   Estimated body mass index is 20.77 kg/m  as calculated from the following:    Height as of 21: 1.626 m (5' 4\").    Weight as of this encounter: 54.9 kg (121 lb).  BP completed using cuff size: regular    Questioned patient about current smoking habits.  Pt. has never smoked.          The following HM Due: pap smear      "

## 2022-08-29 NOTE — PROGRESS NOTES
"SUBJECTIVE:                                                      Dominique is a 22 year old  female who presents for birth control discussion.     Patient's last menstrual period was 2022. Currently has controlled menstrual cycles on birth control patch. It falls off sometimes and she occasionally forgets to replace on correct day. Has been thinking about hormonal IUD. Has had a Kyleena before but this was removed early because of continued bleeding. Shares that insertion was uncomplicated.       GYNECOLOGIC HISTORY:    Dominique is sexually active with  male partner(s).  History sexually transmitted infections: No STD history    Per chart review: \"Pt states she has a hx of anaphylactic reaction to Depo-Provera injection. She states she was given the injection as a teenager and instantly became \"hot and splotchy\" and couldn't bend her fingers. She was transported to the ED and stayed in the hospital for 2 days. She states she was told she was likely allergic to a preservative in the Injection. She has since used an IUD and micronor for birth control without issue\"      HISTORY:  OB History    Para Term  AB Living   3 2 2 0 1 2   SAB IAB Ectopic Multiple Live Births   1 0 0 0 2      # Outcome Date GA Lbr Vern/2nd Weight Sex Delivery Anes PTL Lv   3 Term 21 39w6d 07:30 / 00:15 3.775 kg (8 lb 5.2 oz) M Vag-Spont EPI N KEITH      Name: JOHN VERDUGO      Apgar1: 8  Apgar5: 9   2 SAB 2020 6w0d          1 Term 19 39w0d  3.289 kg (7 lb 4 oz) F Vag-Spont EPI  KEITH     Past Medical History:   Diagnosis Date     Abnormal Pap smear of cervix 2019    Pt reported abnormal pap in 2019 in [place] during pregnancy. Told to repeat pap after delivery but never did. (copied and pasted from 21 visit notes) 21 NIL pap     JASSON (generalized anxiety disorder) 2021     Past Surgical History:   Procedure Laterality Date     NO HISTORY OF SURGERY       Family History   Problem " Relation Age of Onset     Family History Negative Other      No Known Problems Mother      No Known Problems Father      Breast Cancer Maternal Grandmother      No Known Problems Brother      No Known Problems Son      No Known Problems Daughter      Social History     Socioeconomic History     Marital status:      Spouse name: None     Number of children: None     Years of education: None     Highest education level: None   Tobacco Use     Smoking status: Never Smoker     Smokeless tobacco: Never Used     Tobacco comment: dad smokes   Vaping Use     Vaping Use: Never used   Substance and Sexual Activity     Alcohol use: Never     Drug use: Never     Sexual activity: Yes     Partners: Male     Birth control/protection: Pill       Current Outpatient Medications:      buPROPion (WELLBUTRIN XL) 300 MG 24 hr tablet, Take 1 tablet (300 mg) by mouth every morning, Disp: 30 tablet, Rfl: 6     HYDROcodone-acetaminophen (NORCO) 5-325 MG tablet, Take 1-2 tablets by mouth every 6 hours as needed for severe pain, Disp: 20 tablet, Rfl: 0     ondansetron (ZOFRAN ODT) 4 MG ODT tab, Take 1 tablet (4 mg) by mouth every 8 hours as needed for nausea or vomiting, Disp: 20 tablet, Rfl: 0     XULANE 150-35 MCG/24HR patch, REMOVE OLD PATCH AND APPLY NEW PATCH ONTO SKIN ONCE A WEEK FOR 3 WEEKS DO NOT WEAR PATCH WEEK 4(DAYS 22-28) THEN REPEAT, Disp: 9 patch, Rfl: 0     Allergies   Allergen Reactions     Medroxyprogesterone Anaphylaxis     Depo-Provera       Past medical, surgical, social and family history were reviewed and updated in EP[facility].    ROS:    ROS: 10 point ROS neg other than the symptoms noted above in the HPI.      OBJECTIVE:                                                      EXAM:  /72 (BP Location: Left arm, Cuff Size: Adult Regular)   Wt 54.9 kg (121 lb)   LMP 08/28/2022   BMI 20.77 kg/m     BMI: Body mass index is 20.77 kg/m .  Exam:  Constitutional: healthy, alert and no distress  Respiratory: negative,  Percussion normal. Good diaphragmatic excursion.  Psychiatric: mentation appears normal and affect normal/bright  No other exam was necessary today, as this was entirely a counseling appointment.    ASSESSMENT/PLAN:                                                      22 year old female for IUD consult      ICD-10-CM    1. Counseling for birth control regarding intrauterine device (IUD)  Z30.09      - reviewed all IUD options and mechanism of actions. Is interested in Mirena IUD. Reviewed risks and benefits. Reviewed FDA approved for 7 years. Reviewed insertion procedure and post-insertion care. No unprotected intercourse in the 2 weeks before IUD insert. UPT on day of.   - Discussed we cannot predict how each individual will react to birth control method but that she should expect to spot for up to 6-8 weeks, with the amount of bleeding decreasing as time goes on and that we should expect her bleeding pattern to be what it is going to be on Mirena by about 3-6 months post insertion. Discussed how Mirena has a higher chance of decreasing amount of bleeding overall given higher dose of hormone.   - Is due for a follow-up pap. Will obtain this follow-up pap at IUD insertion visit.     More information on IUD insertion sent to patient via [employer].     EMEKA Mcmillan CNM

## 2022-08-29 NOTE — PATIENT INSTRUCTIONS
Your Intrauterine Device (IUD)     What to watch for right after IUD placement:    Some women may experience uterine cramps, bleeding, and/or dizziness during and right after IUD placement     To help minimize the cramps, you may take ibuprofen 800 mg with food prior to your appointment. These symptoms should improve over the next 24 hours.  Mild cramping may be present for a few days after IUD placement. Please continue taking the ibuprofen 800 mg with food three times a day for the next five days.    You may experience spotting or bleeding for the first few weeks after IUD placement    Use condoms or abstain from sex for 7 days after the insertion of your Mirena or Aspen IUD    If you experience fever, abdominal pain, worsening pelvic pain, dizziness, unusually heavy vaginal bleeding, suspected expulsion of device or four smelling vaginal discharge please come to the clinic for evaluation    Please schedule an appointment at the clinic for a string check 4-6 weeks after IUD placement    Your periods may change (Aspen/Mirena):    For the first 3 to 6 months, your monthly period may become irregular. You may also have frequent spotting or light bleeding. A few women have heavy bleeding during this time. After your body adjusts, the number of bleeding days is likely to decrease (but may remain irregular), and you may even find that your periods stop altogether for as long as Aspen/Mirena is in place.  Your periods will return rapidly once the IUD is removed.      ParaGard IUD users:    ParaGard IUD users may experience heavier than normal cycles while their IUD is in place, this is considered normal   Back-up contraception is not needed          Checking for your strings:    We encourage everyone with an IUD in place to check for their strings monthly    You may check your own IUD strings by inserting a finger into the vagina and feeling the strings as they exit the cervix.  The strings will initially feel firm, like  fishing line, but will soften over a few weeks.  After the strings have softened, you or your partner should not be able to feel the strings during intercourse.     If the string length greatly changes or if you cannot feel your strings at all please make an appointment to see you midwife and use a backup method of contraception like a condom.    If you can feel something hard/plastic like the IUD may not be in the correct place. You should then see your healthcare provider to have the position confirmed with ultrasound.       Remember:    IUD's do not protect against HIV or STIs.  IUD's do not prevent the formation of ovarian cysts.  IUD's do not typically reduce acne or cause weight gain or mood changes.    For more information:  [email or web address]      If you have questions or concerns please call:    Dorothy Jacobs  [phone]

## 2022-09-06 ENCOUNTER — VIRTUAL VISIT (OUTPATIENT)
Dept: PSYCHOLOGY | Facility: CLINIC | Age: 22
End: 2022-09-06
Payer: COMMERCIAL

## 2022-09-06 DIAGNOSIS — F32.1 MODERATE MAJOR DEPRESSION (H): Primary | ICD-10-CM

## 2022-09-06 DIAGNOSIS — F41.9 ANXIETY DISORDER, UNSPECIFIED TYPE: ICD-10-CM

## 2022-09-06 PROCEDURE — 96131 PSYCL TST EVAL PHYS/QHP EA: CPT | Mod: 95 | Performed by: PSYCHOLOGIST

## 2022-09-06 PROCEDURE — 96130 PSYCL TST EVAL PHYS/QHP 1ST: CPT | Mod: 95 | Performed by: PSYCHOLOGIST

## 2022-09-06 NOTE — PROGRESS NOTES
[facility]     Patient: Dominique June  YOB: 2000  MRN: [id number]  Date(s) of assessment: 8/16/2022, 9/6/2022    Information about appointment:  Client attended two  sessions to aid in determining client's mental health diagnosis or diagnoses and treatment recommendations that best address client concerns. Client records includingmedical were reviewed. A diagnostic assessment was conducted at the initial appointment. Client completed several rating scales to assist in assessing attention-related and other mental health symptoms that may be causing impairments in functioning. Rating scales were also completed by a collateral contact.    Assessment tools:    PHQ9  GAD7  MMPI-3  Clinical Interview (8/16/22)    Assessment Results:  MMPI-3  Patient provided a valid and interpretable profile. She reports experiencing a great deal of emotional turmoil. She likely feels overwhelmed by her life situation and may find that she lacks the motivation and energy to get things done. Depression may be a prominent presenting issue. Responses are indicative of someone who engages in a variety of externalizing behaviors that may create difficulty in their life. She likely has a low frustration tolerance and may feel that she is mistreated quite often. As a result she may be cynical and untrusting of others.     Patient has endorsed a number of somatic complaints and likely experiences somatic symptoms when stressed. She may frequently feel bored and isolated and lack a great deal of positive emotional experiences.  She reports negative emotional experiences including anxiety, excessive worry and irritability. She is likely prone to feelings of insecurity and guilt. She may experience hypmania including irritability, heightened mood, heightened energy and poor impulse control. She reports feeling ill and fatigue may interfere with work and other activities. This maliaise may cause depressive feelings and a  sense that she can not handle everyday stressors.    She reports problems with memory and concentration and likely lacks sufficient resources to manage stress effectively. She reports some suicidal ideation and helplessness. She may feel pessimistic about her life getting better.   Patient reports a lack of self confidence. She may be indecisive and avoid making difficult life decisions.   She endorsed negative feelings toward family and may have experienced mistreatment and conflict with them. She may use substances in order to relax. Her use of substances may have caused conflict in the past.       Patient Health Questionnaire- 9 (PHQ-9)   PHQ 7/30/2022 8/4/2022   PHQ-9 Total Score 19 18   Q9: Thoughts of better off dead/self-harm past 2 weeks Several days Several days   F/U: Thoughts of suicide or self-harm Yes Yes   F/U: Self harm-plan No No   F/U: Self-harm action No No   F/U: Safety concerns No No     [place] Suicide Severity Rating Scale (Lifetime/Recent)  [place] Suicide Severity Rating (Lifetime/Recent) 3/27/2021 3/27/2021 8/4/2022   Q1 Wished to be Dead (Past Month) no no -   Q2 Suicidal Thoughts (Past Month) no no -   Q3 Suicidal Thought Method no - -   Q4 Suicidal Intent without Specific Plan no - -   Q5 Suicide Intent with Specific Plan no - -   Q6 Suicide Behavior (Lifetime) no - -   1. Wish to be Dead (Lifetime) - - 1   Wish to be Dead Description (Lifetime) - - Passive SI   1. Wish to be Dead (Past 1 Month) - - 1   Wish to be Dead Description (Past 1 Month) - - Passive SI   2. Non-Specific Active Suicidal Thoughts (Lifetime) - - 0   Actual Attempt (Lifetime) - - 0   Has subject engaged in non-suicidal self-injurious behavior? (Lifetime) - - 0   Interrupted Attempts (Lifetime) - - 0   Aborted or Self-Interrupted Attempt (Lifetime) - - 0   Preparatory Acts or Behavior (Lifetime) - - 0   Calculated C-SSRS Risk Score (Lifetime/Recent) - - Low Risk     JASSON-7 SCORE 8/31/2021 7/30/2022 8/16/2022   Total  "Score - 21 (severe anxiety) 18 (severe anxiety)   Total Score 4 21 18       Summary : Patient is a 22 year old,  mother of 2 young children. She was prescribed Wellbutrin 5 months ago and finds her mood is not improving along with increased irritability. She states the problem(s) began 6/15/2022. She remarked, \"Beulah been struggling a lot with my anxiety and my heart is beating fast, sweating almost daily to the point where its making me irrationally angry.\" Patient has minimal help with her children as her  travels for work during the week. She recently began psychotherapy to learn ways to cope.      Diagnosis:     Moderate major depression  Unspecified anxiety disorder    Recommendations:    Schedule an appointment with your physician to discuss a medication evaluation. and Continue with newly established therapist.    GERONIMO PAULINO PsyD      Psychological Testing   Billing/Services Summary       Testing Evaluation Services Base: 82546  (1st 60 mins) Add-on: 72665  (each addtl 60 mins)   Record Review and Clarify Referral Question   7:30-7:40, 8/16/22 10 minutes               Integration/Report Generation   1:00-2:30, 9/8/22 MMPI (90) 90 minutes   Interactive Feedback Session  9:00-9:15 , 9/6/22 15 minutes   Post-Service Work   9:15-9:30, 9/6/22 15 minutes   Total Time: 130 minutes (2 hours, 10 minutes)   Total Units: 1 1                                 Diagnosis(es): Moderate major depression (F32.1); Unspecified Anxiety Disorder (F41.9)       "

## 2022-10-22 ENCOUNTER — HEALTH MAINTENANCE LETTER (OUTPATIENT)
Age: 22
End: 2022-10-22

## 2022-10-30 ENCOUNTER — MYC MEDICAL ADVICE (OUTPATIENT)
Dept: INTERNAL MEDICINE | Facility: CLINIC | Age: 22
End: 2022-10-30

## 2022-11-07 ENCOUNTER — OFFICE VISIT (OUTPATIENT)
Dept: MIDWIFE SERVICES | Facility: CLINIC | Age: 22
End: 2022-11-07
Payer: COMMERCIAL

## 2022-11-07 VITALS — DIASTOLIC BLOOD PRESSURE: 58 MMHG | SYSTOLIC BLOOD PRESSURE: 102 MMHG | BODY MASS INDEX: 21.73 KG/M2 | WEIGHT: 126.6 LBS

## 2022-11-07 DIAGNOSIS — Z30.430 ENCOUNTER FOR INSERTION OF INTRAUTERINE CONTRACEPTIVE DEVICE: Primary | ICD-10-CM

## 2022-11-07 DIAGNOSIS — Z12.4 SCREENING FOR CERVICAL CANCER: ICD-10-CM

## 2022-11-07 DIAGNOSIS — Z11.3 ROUTINE SCREENING FOR STI (SEXUALLY TRANSMITTED INFECTION): ICD-10-CM

## 2022-11-07 PROBLEM — K21.9 GASTROESOPHAGEAL REFLUX DISEASE WITHOUT ESOPHAGITIS: Status: ACTIVE | Noted: 2021-11-01

## 2022-11-07 PROBLEM — Z36.89 ENCOUNTER FOR TRIAGE IN PREGNANT PATIENT: Status: RESOLVED | Noted: 2021-03-26 | Resolved: 2022-11-07

## 2022-11-07 PROCEDURE — 87624 HPV HI-RISK TYP POOLED RSLT: CPT | Performed by: ADVANCED PRACTICE MIDWIFE

## 2022-11-07 PROCEDURE — 58300 INSERT INTRAUTERINE DEVICE: CPT | Performed by: ADVANCED PRACTICE MIDWIFE

## 2022-11-07 PROCEDURE — G0145 SCR C/V CYTO,THINLAYER,RESCR: HCPCS | Performed by: ADVANCED PRACTICE MIDWIFE

## 2022-11-07 PROCEDURE — 87491 CHLMYD TRACH DNA AMP PROBE: CPT | Performed by: ADVANCED PRACTICE MIDWIFE

## 2022-11-07 PROCEDURE — 87591 N.GONORRHOEAE DNA AMP PROB: CPT | Performed by: ADVANCED PRACTICE MIDWIFE

## 2022-11-07 RX ORDER — GABAPENTIN 100 MG/1
CAPSULE ORAL
COMMUNITY
Start: 2022-10-10

## 2022-11-07 RX ORDER — ESCITALOPRAM OXALATE 10 MG/1
TABLET ORAL
COMMUNITY
Start: 2022-10-10

## 2022-11-07 NOTE — NURSING NOTE
"Chief Complaint   Patient presents with     Contraception     Mirena IUD insertion. Consult done on 2022 to switch from the patch birth control to a Mirena IUD     Women's Health     Last pap was on 2021: NIL. Due per pap tracking -pt reported abnormal pap in 2019  G/C due       Initial /58 (BP Location: Right arm, Cuff Size: Adult Regular)   Wt 57.4 kg (126 lb 9.6 oz)   LMP 10/27/2022   Breastfeeding No   BMI 21.73 kg/m   Estimated body mass index is 21.73 kg/m  as calculated from the following:    Height as of 21: 1.626 m (5' 4\").    Weight as of this encounter: 57.4 kg (126 lb 9.6 oz).  BP completed using cuff size: regular    Questioned patient about current smoking habits.  Pt. currently vapes.  Advised about smoking cessation.          The following HM Due: pap smear  Patel (-)       "

## 2022-11-07 NOTE — PROGRESS NOTES
IUD Insertion:  Subjective: Dominique June is a 22 year old  presents for IUD and desires Mirena type IUD.  Patient has been given the opportunity to ask questions about all forms of birth control, including all options appropriate for Dominique June.   Dominique June understands she may have the IUD removed at any time. IUD should be removed by a health care provider.  The entire insertion procedure was reviewed with the patient, including care after placement.    Patient's last menstrual period was 10/27/2022.   Removed contraceptive patch today, has been using consistently and denies any chance of pregnancy.    Is due for pap and GC/CT today, agrees to complete.     /58 (BP Location: Right arm, Cuff Size: Adult Regular)   Wt 57.4 kg (126 lb 9.6 oz)   LMP 10/27/2022   Breastfeeding No   BMI 21.73 kg/m    Pelvic Exam:   EG/BUS: normal genital architecture without lesions, erythema or abnormal secretions.   Vagina: moist, pink, rugae with physiologic discharge and secretions  Cervix: muitiparous no lesions and pink, moist, closed, without lesion or CMT.  Pap and GC/CT obtained.     PROCEDURE NOTE: -- IUD Insertion    Reason for Insertion: contraception    Under sterile technique, cervix was visualized with speculum and prepped with Betadine solution swab x 3. Tenaculum was placed for stability. The uterus was gently straightened and sounded to 7.0 cm. IUD prepared for placement, and IUD inserted according to 's instructions without difficulty or significant resitance, and deployed at the fundus. The strings were visualized and trimmed to 3.0 cm from the external os. Tenaculum was removed and hemostasis noted. Speculum removed.  Patient tolerated procedure well.    Lot # - see MAR  Exp - see MAR    EBL: minimal    Complications: none    ASSESSMENT:     ICD-10-CM    1. Encounter for insertion of intrauterine contraceptive device  Z30.430 levonorgestrel (MIRENA) 20 MCG/DAY IUD      levonorgestrel (MIRENA) 20 MCG/DAY IUD 20 mcg     INSERTION INTRAUTERINE DEVICE      2. Screening for cervical cancer  Z12.4 Pap thin layer screen only - recommended age 21 - 24 years      3. Routine screening for STI (sexually transmitted infection)  Z11.3 NEISSERIA GONORRHOEA PCR     CHLAMYDIA TRACHOMATIS PCR           PLAN:  Given 's handouts, including when to have IUD removed, list of danger s/sx, side effects and follow up recommended. Will need condom use for prevention of STD if indicated. Back up contraception or abstinence advised for 7 days if progestin method. Advised to call for any fever, for prolonged or severe pain or bleeding, abnormal vaginal discharge, or unable to palpate strings. She was advised to use pain medications (ibuprofen) as needed for mild to moderate pain. Advised to follow-up in clinic in 6 weeks for IUD string check if unable to find strings or as directed by provider.     EMEKA Bermudez, PORSHA 11/7/2022 2:38 PM

## 2022-11-08 LAB
C TRACH DNA SPEC QL NAA+PROBE: NEGATIVE
N GONORRHOEA DNA SPEC QL NAA+PROBE: NEGATIVE

## 2022-11-09 LAB
BKR LAB AP GYN ADEQUACY: NORMAL
BKR LAB AP GYN INTERPRETATION: NORMAL
BKR LAB AP HPV REFLEX: NORMAL
BKR LAB AP PREVIOUS ABNL DX: NORMAL
BKR LAB AP PREVIOUS ABNORMAL: NORMAL
PATH REPORT.COMMENTS IMP SPEC: NORMAL
PATH REPORT.COMMENTS IMP SPEC: NORMAL
PATH REPORT.RELEVANT HX SPEC: NORMAL

## 2022-11-10 LAB
HUMAN PAPILLOMA VIRUS 16 DNA: NEGATIVE
HUMAN PAPILLOMA VIRUS 18 DNA: NEGATIVE
HUMAN PAPILLOMA VIRUS FINAL DIAGNOSIS: NORMAL
HUMAN PAPILLOMA VIRUS OTHER HR: NEGATIVE

## 2022-11-11 ENCOUNTER — IMMUNIZATION (OUTPATIENT)
Dept: FAMILY MEDICINE | Facility: CLINIC | Age: 22
End: 2022-11-11
Payer: COMMERCIAL

## 2022-11-11 DIAGNOSIS — Z23 NEED FOR PROPHYLACTIC VACCINATION AND INOCULATION AGAINST INFLUENZA: Primary | ICD-10-CM

## 2022-11-11 PROCEDURE — 99207 PR NO CHARGE NURSE ONLY: CPT

## 2022-11-11 PROCEDURE — 90471 IMMUNIZATION ADMIN: CPT

## 2022-11-11 PROCEDURE — 90686 IIV4 VACC NO PRSV 0.5 ML IM: CPT

## 2023-06-15 ENCOUNTER — TELEPHONE (OUTPATIENT)
Dept: INTERNAL MEDICINE | Facility: CLINIC | Age: 23
End: 2023-06-15
Payer: COMMERCIAL

## 2023-06-15 NOTE — TELEPHONE ENCOUNTER
Patient calls c/o yeast infection and has a question. Patient has not been seen in 6 mo and would need an appointment. Patient opted for UC.

## 2023-06-18 ENCOUNTER — HEALTH MAINTENANCE LETTER (OUTPATIENT)
Age: 23
End: 2023-06-18

## 2023-08-02 ENCOUNTER — E-VISIT (OUTPATIENT)
Dept: INTERNAL MEDICINE | Facility: CLINIC | Age: 23
End: 2023-08-02

## 2023-08-02 ENCOUNTER — E-VISIT (OUTPATIENT)
Dept: URGENT CARE | Facility: URGENT CARE | Age: 23
End: 2023-08-02
Payer: COMMERCIAL

## 2023-08-02 DIAGNOSIS — R39.9 UTI SYMPTOMS: Primary | ICD-10-CM

## 2023-08-02 DIAGNOSIS — N39.0 ACUTE UTI (URINARY TRACT INFECTION): Primary | ICD-10-CM

## 2023-08-02 PROCEDURE — 99421 OL DIG E/M SVC 5-10 MIN: CPT | Performed by: PHYSICIAN ASSISTANT

## 2023-08-02 PROCEDURE — 99207 PR NON-BILLABLE SERV PER CHARTING: CPT | Performed by: INTERNAL MEDICINE

## 2023-08-02 RX ORDER — SULFAMETHOXAZOLE/TRIMETHOPRIM 800-160 MG
1 TABLET ORAL 2 TIMES DAILY
Qty: 6 TABLET | Refills: 0 | Status: SHIPPED | OUTPATIENT
Start: 2023-08-02 | End: 2023-08-05

## 2023-08-02 NOTE — PATIENT INSTRUCTIONS
Dear Dominique June    After reviewing your responses, I've been able to diagnose you with a urinary tract infection, which is a common infection of the bladder with bacteria.  This is not a sexually transmitted infection, though urinating immediately after intercourse can help prevent infections.  Drinking lots of fluids is also helpful to clear your current infection and prevent the next one.      I have sent a prescription for antibiotics to your pharmacy to treat this infection.    It is important that you take all of your prescribed medication even if your symptoms are improving after a few doses.  Taking all of your medicine helps prevent the symptoms from returning.     If your symptoms worsen, you develop pain in your back or stomach, develop fevers, or are not improving in 5 days, please contact your primary care provider for an appointment or visit any of our convenient Walk-in or Urgent Care Centers to be seen, which can be found on our website here.    Thanks again for choosing us as your health care partner,    Debo Rodriguez PA-C

## 2023-10-05 ENCOUNTER — OFFICE VISIT (OUTPATIENT)
Dept: FAMILY MEDICINE | Facility: CLINIC | Age: 23
End: 2023-10-05
Payer: COMMERCIAL

## 2023-10-05 VITALS
SYSTOLIC BLOOD PRESSURE: 108 MMHG | TEMPERATURE: 98.3 F | BODY MASS INDEX: 21.99 KG/M2 | HEART RATE: 78 BPM | DIASTOLIC BLOOD PRESSURE: 72 MMHG | HEIGHT: 65 IN | OXYGEN SATURATION: 98 % | WEIGHT: 132 LBS | RESPIRATION RATE: 18 BRPM

## 2023-10-05 DIAGNOSIS — R20.2 TINGLING OF BOTH FEET: ICD-10-CM

## 2023-10-05 DIAGNOSIS — Z11.59 NEED FOR HEPATITIS C SCREENING TEST: ICD-10-CM

## 2023-10-05 DIAGNOSIS — R73.09 ELEVATED GLUCOSE: ICD-10-CM

## 2023-10-05 DIAGNOSIS — L29.9 PRURITUS: Primary | ICD-10-CM

## 2023-10-05 PROBLEM — K86.81 EXOCRINE PANCREATIC INSUFFICIENCY: Status: ACTIVE | Noted: 2023-10-05

## 2023-10-05 LAB
ALBUMIN SERPL BCG-MCNC: 4.9 G/DL (ref 3.5–5.2)
ALP SERPL-CCNC: 82 U/L (ref 35–104)
ALT SERPL W P-5'-P-CCNC: 9 U/L (ref 0–50)
ANION GAP SERPL CALCULATED.3IONS-SCNC: 12 MMOL/L (ref 7–15)
AST SERPL W P-5'-P-CCNC: 22 U/L (ref 0–45)
BILIRUB SERPL-MCNC: 0.4 MG/DL
BUN SERPL-MCNC: 6.8 MG/DL (ref 6–20)
CALCIUM SERPL-MCNC: 9.8 MG/DL (ref 8.6–10)
CHLORIDE SERPL-SCNC: 106 MMOL/L (ref 98–107)
CREAT SERPL-MCNC: 0.83 MG/DL (ref 0.51–0.95)
DEPRECATED HCO3 PLAS-SCNC: 24 MMOL/L (ref 22–29)
EGFRCR SERPLBLD CKD-EPI 2021: >90 ML/MIN/1.73M2
ERYTHROCYTE [DISTWIDTH] IN BLOOD BY AUTOMATED COUNT: 12.1 % (ref 10–15)
GLUCOSE SERPL-MCNC: 89 MG/DL (ref 70–99)
HBA1C MFR BLD: 5.1 % (ref 0–5.6)
HCT VFR BLD AUTO: 41.8 % (ref 35–47)
HGB BLD-MCNC: 14.4 G/DL (ref 11.7–15.7)
MCH RBC QN AUTO: 32.1 PG (ref 26.5–33)
MCHC RBC AUTO-ENTMCNC: 34.4 G/DL (ref 31.5–36.5)
MCV RBC AUTO: 93 FL (ref 78–100)
PLATELET # BLD AUTO: 241 10E3/UL (ref 150–450)
POTASSIUM SERPL-SCNC: 4.6 MMOL/L (ref 3.4–5.3)
PROT SERPL-MCNC: 7.4 G/DL (ref 6.4–8.3)
RBC # BLD AUTO: 4.48 10E6/UL (ref 3.8–5.2)
SODIUM SERPL-SCNC: 142 MMOL/L (ref 135–145)
VIT B12 SERPL-MCNC: 606 PG/ML (ref 232–1245)
WBC # BLD AUTO: 8.2 10E3/UL (ref 4–11)

## 2023-10-05 PROCEDURE — 36415 COLL VENOUS BLD VENIPUNCTURE: CPT | Performed by: NURSE PRACTITIONER

## 2023-10-05 PROCEDURE — 85027 COMPLETE CBC AUTOMATED: CPT | Performed by: NURSE PRACTITIONER

## 2023-10-05 PROCEDURE — 83036 HEMOGLOBIN GLYCOSYLATED A1C: CPT | Performed by: NURSE PRACTITIONER

## 2023-10-05 PROCEDURE — 86803 HEPATITIS C AB TEST: CPT | Performed by: NURSE PRACTITIONER

## 2023-10-05 PROCEDURE — 80053 COMPREHEN METABOLIC PANEL: CPT | Performed by: NURSE PRACTITIONER

## 2023-10-05 PROCEDURE — 82607 VITAMIN B-12: CPT | Performed by: NURSE PRACTITIONER

## 2023-10-05 PROCEDURE — 99214 OFFICE O/P EST MOD 30 MIN: CPT | Performed by: NURSE PRACTITIONER

## 2023-10-05 RX ORDER — AMOXICILLIN 500 MG/1
TABLET, FILM COATED ORAL
COMMUNITY
Start: 2023-06-17 | End: 2023-10-05

## 2023-10-05 RX ORDER — AMOXICILLIN 500 MG/1
CAPSULE ORAL
COMMUNITY
Start: 2023-08-14 | End: 2023-10-05

## 2023-10-05 RX ORDER — HYDROXYZINE HYDROCHLORIDE 25 MG/1
25 TABLET, FILM COATED ORAL EVERY 8 HOURS PRN
Qty: 60 TABLET | Refills: 3 | Status: SHIPPED | OUTPATIENT
Start: 2023-10-05

## 2023-10-05 RX ORDER — IBUPROFEN 600 MG/1
TABLET, FILM COATED ORAL
COMMUNITY
Start: 2023-06-17

## 2023-10-05 ASSESSMENT — ANXIETY QUESTIONNAIRES
6. BECOMING EASILY ANNOYED OR IRRITABLE: SEVERAL DAYS
4. TROUBLE RELAXING: SEVERAL DAYS
5. BEING SO RESTLESS THAT IT IS HARD TO SIT STILL: SEVERAL DAYS
2. NOT BEING ABLE TO STOP OR CONTROL WORRYING: SEVERAL DAYS
1. FEELING NERVOUS, ANXIOUS, OR ON EDGE: SEVERAL DAYS
IF YOU CHECKED OFF ANY PROBLEMS ON THIS QUESTIONNAIRE, HOW DIFFICULT HAVE THESE PROBLEMS MADE IT FOR YOU TO DO YOUR WORK, TAKE CARE OF THINGS AT HOME, OR GET ALONG WITH OTHER PEOPLE: SOMEWHAT DIFFICULT
3. WORRYING TOO MUCH ABOUT DIFFERENT THINGS: SEVERAL DAYS
7. FEELING AFRAID AS IF SOMETHING AWFUL MIGHT HAPPEN: SEVERAL DAYS
GAD7 TOTAL SCORE: 7
GAD7 TOTAL SCORE: 7

## 2023-10-05 ASSESSMENT — PATIENT HEALTH QUESTIONNAIRE - PHQ9
SUM OF ALL RESPONSES TO PHQ QUESTIONS 1-9: 3
SUM OF ALL RESPONSES TO PHQ QUESTIONS 1-9: 3
10. IF YOU CHECKED OFF ANY PROBLEMS, HOW DIFFICULT HAVE THESE PROBLEMS MADE IT FOR YOU TO DO YOUR WORK, TAKE CARE OF THINGS AT HOME, OR GET ALONG WITH OTHER PEOPLE: NOT DIFFICULT AT ALL

## 2023-10-05 ASSESSMENT — PAIN SCALES - GENERAL: PAINLEVEL: NO PAIN (0)

## 2023-10-05 NOTE — PROGRESS NOTES
Answers submitted by the patient for this visit:  Patient Health Questionnaire (Submitted on 10/5/2023)  If you checked off any problems, how difficult have these problems made it for you to do your work, take care of things at home, or get along with other people?: Not difficult at all  PHQ9 TOTAL SCORE: 3  JASSON-7 (Submitted on 10/5/2023)  JASSON 7 TOTAL SCORE: 7  General Questionnaire (Submitted on 10/5/2023)  Chief Complaint: Chronic problems general questions HPI Form  How many servings of fruits and vegetables do you eat daily?: 0-1  On average, how many sweetened beverages do you drink each day (Examples: soda, juice, sweet tea, etc.  Do NOT count diet or artificially sweetened beverages)?: 6  How many minutes a day do you exercise enough to make your heart beat faster?: 9 or less  How many days a week do you exercise enough to make your heart beat faster?: 3 or less  How many days per week do you miss taking your medication?: 1  What makes it hard for you to take your medication every day?: remembering to take  General Concern (Submitted on 10/5/2023)  Chief Complaint: Chronic problems general questions HPI Form  What is the reason for your visit today?: I think i have developed diabetes, experiencing alot of the symptoms & had bloodwork come back a year+ ago that i was prediabetic at the time. Also possible neuropathy? Painful stinging in toes when i lay down at night  When did your symptoms begin?: More than a month  What are your symptoms?: Peeing a lot, extremely itchy legs, night sweats, stinging pain in lower extremities (mainly toes at night)  How would you describe these symptoms?: Moderate  Are your symptoms:: Worsening  Have you had these symptoms before?: No  Is there anything that makes you feel worse?: Pain in feet is worse when christine spent alot of time on my feet during the day  Is there anything that makes you feel better?: No

## 2023-10-05 NOTE — PROGRESS NOTES
Subjective   Dominique is a 23 year old, presenting for the following health issues:  History of Present Illness (Patient is concerned that she may have developed diabetes, has been told she pre diabetic in the past. She has to urinate a lot, night sweats, extremely itchy legs, stinging pain in lower extremities mainly in her toes at night) and Establish Care        10/5/2023    11:16 AM   Additional Questions   Roomed by Shari CLEMONS CMA       History of Present Illness       Reason for visit:  I think i have developed diabetes, experiencing alot of the symptoms & had bloodwork come back a year+ ago that i was prediabetic at the time. Also possible neuropathy? Painful stinging in toes when i lay down at night  Symptom onset:  More than a month  Symptoms include:  Peeing a lot, extremely itchy legs, night sweats, stinging pain in lower extremities (mainly toes at night)  Symptom intensity:  Moderate  Symptom progression:  Worsening  Had these symptoms before:  No  What makes it worse:  Pain in feet is worse when christine spent alot of time on my feet during the day  What makes it better:  No    She eats 0-1 servings of fruits and vegetables daily.She consumes 6 sweetened beverage(s) daily.She exercises with enough effort to increase her heart rate 9 or less minutes per day.  She exercises with enough effort to increase her heart rate 3 or less days per week. She is missing 1 dose(s) of medications per week.  She is not taking prescribed medications regularly due to remembering to take.       Review of Systems   Constitutional, HEENT, cardiovascular, pulmonary, gi and gu systems are negative, except as otherwise noted.    Itchy lower legs- scratches to the point of bruising.  No change in soaps, lotions, new clothing, etc.  Urinary frequency- no dysuria.  Feet burn- worse at night.  Fatigue.    Objective    /72 (BP Location: Left arm, Patient Position: Sitting, Cuff Size: Adult Regular)   Pulse 78   Temp 98.3  F (36.8  " C) (Oral)   Resp 18   Ht 1.638 m (5' 4.5\")   Wt 59.9 kg (132 lb)   LMP  (LMP Unknown)   SpO2 98%   Breastfeeding No   BMI 22.31 kg/m    Body mass index is 22.31 kg/m .  Physical Exam   GENERAL: healthy, alert and no distress  NECK: no adenopathy, no asymmetry, masses, or scars and thyroid normal to palpation  RESP: lungs clear to auscultation - no rales, rhonchi or wheezes  CV: regular rate and rhythm, normal S1 S2, no S3 or S4, no murmur, click or rub, no peripheral edema and peripheral pulses strong  ABDOMEN: soft, nontender, no hepatosplenomegaly, no masses and bowel sounds normal  MS: no gross musculoskeletal defects noted, no edema  SKIN: no suspicious lesions or rashes  PSYCH: mentation appears normal, affect normal/bright    Previous lab results reviewed.     A/P:  (L29.9) Pruritus  (primary encounter diagnosis)  Comment:   Plan: Comprehensive metabolic panel (BMP + Alb, Alk         Phos, ALT, AST, Total. Bili, TP), CBC with         platelets, Hemoglobin A1c, hydrOXYzine (ATARAX)        25 MG tablet            (R73.09) Elevated glucose  Comment:   Plan: Hemoglobin A1c            (Z11.59) Need for hepatitis C screening test  Comment:   Plan: Hepatitis C Screen Reflex to HCV RNA Quant and         Genotype            (R20.2) Tingling of both feet  Comment:   Plan: Vitamin B12                          "

## 2023-10-06 ENCOUNTER — MYC MEDICAL ADVICE (OUTPATIENT)
Dept: FAMILY MEDICINE | Facility: CLINIC | Age: 23
End: 2023-10-06
Payer: COMMERCIAL

## 2023-10-06 DIAGNOSIS — L29.9 PRURITUS: Primary | ICD-10-CM

## 2023-10-06 LAB — HCV AB SERPL QL IA: NONREACTIVE

## 2023-10-16 ENCOUNTER — LAB (OUTPATIENT)
Dept: LAB | Facility: CLINIC | Age: 23
End: 2023-10-16
Payer: COMMERCIAL

## 2023-10-16 DIAGNOSIS — L29.9 PRURITUS: ICD-10-CM

## 2023-10-16 LAB — TSH SERPL DL<=0.005 MIU/L-ACNC: 1.83 UIU/ML (ref 0.3–4.2)

## 2023-10-16 PROCEDURE — 84443 ASSAY THYROID STIM HORMONE: CPT

## 2023-10-16 PROCEDURE — 36415 COLL VENOUS BLD VENIPUNCTURE: CPT

## 2023-10-18 ENCOUNTER — MYC MEDICAL ADVICE (OUTPATIENT)
Dept: FAMILY MEDICINE | Facility: CLINIC | Age: 23
End: 2023-10-18
Payer: COMMERCIAL

## 2023-10-23 NOTE — TELEPHONE ENCOUNTER
Spoke to patient and assisted her to make an appointment to have her IUD removed and to discuss other birth control options.  Discussed some OTC options for the management of her itching until appointment.  ERICA Cha RN  [facility]

## 2023-10-30 ENCOUNTER — OFFICE VISIT (OUTPATIENT)
Dept: FAMILY MEDICINE | Facility: CLINIC | Age: 23
End: 2023-10-30
Payer: COMMERCIAL

## 2023-10-30 VITALS
BODY MASS INDEX: 18.03 KG/M2 | RESPIRATION RATE: 18 BRPM | DIASTOLIC BLOOD PRESSURE: 70 MMHG | OXYGEN SATURATION: 98 % | WEIGHT: 105.6 LBS | HEIGHT: 64 IN | TEMPERATURE: 98 F | HEART RATE: 77 BPM | SYSTOLIC BLOOD PRESSURE: 110 MMHG

## 2023-10-30 DIAGNOSIS — Z30.432 ENCOUNTER FOR IUD REMOVAL: Primary | ICD-10-CM

## 2023-10-30 DIAGNOSIS — E46 PROTEIN-CALORIE MALNUTRITION, UNSPECIFIED SEVERITY (H): ICD-10-CM

## 2023-10-30 DIAGNOSIS — Z78.9 USES HORMONAL CONTRACEPTIVE PATCH AS PRIMARY BIRTH CONTROL METHOD: ICD-10-CM

## 2023-10-30 PROBLEM — Z30.430 ENCOUNTER FOR INSERTION OF INTRAUTERINE CONTRACEPTIVE DEVICE: Status: RESOLVED | Noted: 2022-11-07 | Resolved: 2023-10-30

## 2023-10-30 PROCEDURE — 99212 OFFICE O/P EST SF 10 MIN: CPT | Mod: 25 | Performed by: NURSE PRACTITIONER

## 2023-10-30 PROCEDURE — 58301 REMOVE INTRAUTERINE DEVICE: CPT | Performed by: NURSE PRACTITIONER

## 2023-10-30 RX ORDER — NORELGESTROMIN AND ETHINYL ESTRADIOL 35; 150 UG/MG; UG/MG
PATCH TRANSDERMAL
Qty: 4 PATCH | Refills: 3 | Status: SHIPPED | OUTPATIENT
Start: 2023-10-30 | End: 2024-03-09

## 2023-10-30 ASSESSMENT — PAIN SCALES - GENERAL: PAINLEVEL: NO PAIN (0)

## 2023-10-30 NOTE — PROGRESS NOTES
"  Answers submitted by the patient for this visit:  General Questionnaire (Submitted on 10/30/2023)  Chief Complaint: Chronic problems general questions HPI Form  What is the reason for your visit today? : Iud removal + new birth control  How many servings of fruits and vegetables do you eat daily?: 0-1  On average, how many sweetened beverages do you drink each day (Examples: soda, juice, sweet tea, etc.  Do NOT count diet or artificially sweetened beverages)?: 4  How many minutes a day do you exercise enough to make your heart beat faster?: 9 or less  How many days a week do you exercise enough to make your heart beat faster?: 3 or less  How many days per week do you miss taking your medication?: 1  What makes it hard for you to take your medication every day?: remembering to take      Subjective   Dominique is a 23 year old, presenting for the following health issues:  IUD (IUD removal )      10/30/2023     2:13 PM   Additional Questions   Roomed by Melba Ramirze   Accompanied by Self       IUD    History of Present Illness       Reason for visit:  Iud removal + new birth control    She eats 0-1 servings of fruits and vegetables daily.She consumes 4 sweetened beverage(s) daily.She exercises with enough effort to increase her heart rate 9 or less minutes per day.  She exercises with enough effort to increase her heart rate 3 or less days per week. She is missing 1 dose(s) of medications per week.  She is not taking prescribed medications regularly due to remembering to take.     Here for IUD removal.  Has been having itching on legs ever since having IUD inserted.       Review of Systems   Constitutional, HEENT, cardiovascular, pulmonary, gi and gu systems are negative, except as otherwise noted.      Objective    /70   Pulse 77   Temp 98  F (36.7  C) (Temporal)   Resp 18   Ht 1.626 m (5' 4\")   Wt 47.9 kg (105 lb 9.6 oz)   LMP  (LMP Unknown)   SpO2 98%   BMI 18.13 kg/m    Body mass index is 18.13 " kg/m .  Physical Exam   GENERAL: healthy, alert and no distress  RESP: respirations even, nonlabored   (female): normal female external genitalia, normal urethral meatus , vaginal mucosa pink, moist, well rugated, normal cervix, adnexae, and uterus without masses.  IUD removed with ringed forceps.  Patient tolerated removal well.   MS: no gross musculoskeletal defects noted, no edema  SKIN: few scattered erythematous lesions on legs  PSYCH: mentation appears normal, affect normal/bright    A/P:  (Z30.432) Encounter for IUD removal  (primary encounter diagnosis)  Comment:   Plan: REMOVE INTRAUTERINE DEVICE            (Z78.9) Uses hormonal contraceptive patch as primary birth control method  Comment:   Plan: norelgestromin-ethinyl estradiol (ORTHO EVRA)         150-35 MCG/24HR patch            (E46) Protein-calorie malnutrition, unspecified severity (H24)  Comment: Stable  Plan: Balanced diet

## 2023-12-05 ENCOUNTER — IMMUNIZATION (OUTPATIENT)
Dept: NURSING | Facility: CLINIC | Age: 23
End: 2023-12-05
Payer: COMMERCIAL

## 2023-12-05 PROCEDURE — 90686 IIV4 VACC NO PRSV 0.5 ML IM: CPT

## 2023-12-05 PROCEDURE — 90471 IMMUNIZATION ADMIN: CPT

## 2024-01-10 ENCOUNTER — E-VISIT (OUTPATIENT)
Dept: URGENT CARE | Facility: CLINIC | Age: 24
End: 2024-01-10
Payer: COMMERCIAL

## 2024-01-10 DIAGNOSIS — J02.9 SORE THROAT: Primary | ICD-10-CM

## 2024-01-10 PROCEDURE — 99421 OL DIG E/M SVC 5-10 MIN: CPT | Performed by: NURSE PRACTITIONER

## 2024-01-11 NOTE — PATIENT INSTRUCTIONS
Dear Dominique,    After reviewing your responses, I would like you to come in for a swab to make sure we treat you correctly. This swab is to evaluate you for possible COVID and Strep Throat, and should be scheduled for today or tomorrow. Please use the Schedule Now button in [facility] to schedule your swab. Otherwise, click this link to schedule a lab only appointment.    Lab appointments are not available at most locations on the weekends. If no Lab Only appointment is available, you should be seen in any of our convenient Urgent Care Centers for an in person visit, which can be found on our website here.    You will receive instructions with your results in [facility] once they are available.     If your symptoms worsen, you develop difficulty breathing, difficulty with drinking enough to stay hydrated, difficulty swallowing your saliva or have fevers for more than 5 days, please contact your primary care provider for an appointment or visit an Urgent Care Center to be seen.      Thanks again for choosing us as your health care partner.   Haylee Champagne, CNP  Sore Throat: Care Instructions  Overview     Infection by bacteria or a virus causes most sore throats. Cigarette smoke, dry air, air pollution, allergies, and yelling can also cause a sore throat. Sore throats can be painful and annoying. Fortunately, most sore throats go away on their own. If you have a bacterial infection, your doctor may prescribe antibiotics.  Follow-up care is a key part of your treatment and safety. Be sure to make and go to all appointments, and call your doctor if you are having problems. It's also a good idea to know your test results and keep a list of the medicines you take.  How can you care for yourself at home?  If your doctor prescribed antibiotics, take them as directed. Do not stop taking them just because you feel better. You need to take the full course of antibiotics.  Gargle with warm salt water several times a day to help  "reduce swelling and relieve pain. Mix 1/2 teaspoon of salt in 1 cup of warm water.  Take an over-the-counter pain medicine, such as acetaminophen (Tylenol), ibuprofen (Advil, Motrin), or naproxen (Aleve). Read and follow all instructions on the label.  Be careful when taking over-the-counter cold or flu medicines and Tylenol at the same time. Many of these medicines have acetaminophen, which is Tylenol. Read the labels to make sure that you are not taking more than the recommended dose. Too much acetaminophen (Tylenol) can be harmful.  Drink plenty of fluids. Fluids may help soothe an irritated throat. Hot fluids, such as tea or soup, may help decrease throat pain.  Use over-the-counter throat lozenges to soothe pain. Regular cough drops or hard candy may also help. These should not be given to young children because of the risk of choking.  Do not smoke or allow others to smoke around you. If you need help quitting, talk to your doctor about stop-smoking programs and medicines. These can increase your chances of quitting for good.  Use a vaporizer or humidifier to add moisture to your bedroom. Follow the directions for cleaning the machine.  When should you call for help?   Call your doctor now or seek immediate medical care if:    You have trouble breathing.     Your sore throat gets much worse on one side.     You have new or worse trouble swallowing.     You have a new or higher fever.   Watch closely for changes in your health, and be sure to contact your doctor if you do not get better as expected.  Where can you learn more?  Go to https://www.[employer].net/patiented  Enter U420 in the search box to learn more about \"Sore Throat: Care Instructions.\"  Current as of: February 28, 2023               Content Version: 13.8    [id number] [employer].   Care instructions adapted under license by your healthcare professional. If you have questions about a medical condition or this instruction, always ask " your healthcare professional. [employer] disclaims any warranty or liability for your use of this information.

## 2024-01-12 ENCOUNTER — LAB (OUTPATIENT)
Dept: LAB | Facility: CLINIC | Age: 24
End: 2024-01-12
Attending: NURSE PRACTITIONER
Payer: COMMERCIAL

## 2024-01-12 DIAGNOSIS — J02.9 SORE THROAT: ICD-10-CM

## 2024-01-12 LAB
DEPRECATED S PYO AG THROAT QL EIA: NEGATIVE
GROUP A STREP BY PCR: NOT DETECTED

## 2024-01-12 PROCEDURE — 87651 STREP A DNA AMP PROBE: CPT

## 2024-01-12 PROCEDURE — 87635 SARS-COV-2 COVID-19 AMP PRB: CPT

## 2024-01-13 LAB — SARS-COV-2 RNA RESP QL NAA+PROBE: NEGATIVE

## 2024-02-27 DIAGNOSIS — Z78.9 USES HORMONAL CONTRACEPTIVE PATCH AS PRIMARY BIRTH CONTROL METHOD: ICD-10-CM

## 2024-02-27 RX ORDER — NORELGESTROMIN AND ETHINYL ESTRADIOL 35; 150 UG/D; UG/D
PATCH TRANSDERMAL
Qty: 3 PATCH | OUTPATIENT
Start: 2024-02-27

## 2024-03-09 ENCOUNTER — MYC REFILL (OUTPATIENT)
Dept: FAMILY MEDICINE | Facility: CLINIC | Age: 24
End: 2024-03-09
Payer: COMMERCIAL

## 2024-03-09 DIAGNOSIS — Z78.9 USES HORMONAL CONTRACEPTIVE PATCH AS PRIMARY BIRTH CONTROL METHOD: ICD-10-CM

## 2024-03-12 RX ORDER — NORELGESTROMIN AND ETHINYL ESTRADIOL 35; 150 UG/MG; UG/MG
PATCH TRANSDERMAL
Qty: 4 PATCH | Refills: 2 | Status: SHIPPED | OUTPATIENT
Start: 2024-03-12 | End: 2024-06-24

## 2024-06-24 ENCOUNTER — MYC REFILL (OUTPATIENT)
Dept: FAMILY MEDICINE | Facility: CLINIC | Age: 24
End: 2024-06-24
Payer: COMMERCIAL

## 2024-06-24 DIAGNOSIS — Z78.9 USES HORMONAL CONTRACEPTIVE PATCH AS PRIMARY BIRTH CONTROL METHOD: ICD-10-CM

## 2024-06-24 RX ORDER — NORELGESTROMIN AND ETHINYL ESTRADIOL 35; 150 UG/MG; UG/MG
PATCH TRANSDERMAL
Qty: 4 PATCH | Refills: 1 | Status: SHIPPED | OUTPATIENT
Start: 2024-06-24 | End: 2024-07-09

## 2024-07-09 ENCOUNTER — MYC REFILL (OUTPATIENT)
Dept: FAMILY MEDICINE | Facility: CLINIC | Age: 24
End: 2024-07-09
Payer: COMMERCIAL

## 2024-07-09 DIAGNOSIS — Z78.9 USES HORMONAL CONTRACEPTIVE PATCH AS PRIMARY BIRTH CONTROL METHOD: ICD-10-CM

## 2024-07-09 RX ORDER — NORELGESTROMIN AND ETHINYL ESTRADIOL 35; 150 UG/MG; UG/MG
PATCH TRANSDERMAL
Qty: 4 PATCH | Refills: 1 | Status: SHIPPED | OUTPATIENT
Start: 2024-07-09

## 2024-08-11 ENCOUNTER — HEALTH MAINTENANCE LETTER (OUTPATIENT)
Age: 24
End: 2024-08-11

## 2024-08-21 ENCOUNTER — OFFICE VISIT (OUTPATIENT)
Dept: FAMILY MEDICINE | Facility: CLINIC | Age: 24
End: 2024-08-21
Payer: COMMERCIAL

## 2024-08-21 VITALS
HEART RATE: 90 BPM | OXYGEN SATURATION: 100 % | WEIGHT: 129 LBS | SYSTOLIC BLOOD PRESSURE: 112 MMHG | HEIGHT: 64 IN | TEMPERATURE: 98.1 F | DIASTOLIC BLOOD PRESSURE: 64 MMHG | BODY MASS INDEX: 22.02 KG/M2

## 2024-08-21 DIAGNOSIS — H61.22 IMPACTED CERUMEN OF LEFT EAR: Primary | ICD-10-CM

## 2024-08-21 DIAGNOSIS — J34.89 NASAL LESION: ICD-10-CM

## 2024-08-21 PROCEDURE — 99213 OFFICE O/P EST LOW 20 MIN: CPT | Mod: 25 | Performed by: FAMILY MEDICINE

## 2024-08-21 PROCEDURE — 69210 REMOVE IMPACTED EAR WAX UNI: CPT | Mod: LT | Performed by: FAMILY MEDICINE

## 2024-08-21 NOTE — PROGRESS NOTES
Assessment & Plan     (H61.22) Impacted cerumen of left ear  (primary encounter diagnosis)  Comment: Impacted cerumen left ear which I believe is the sensation that she is experiencing  Plan:      (J34.89) Nasal lesion  Comment: New onset left nasal lesion, consistent with a nasal polyp, I suspect there is some underlying allergies or perhaps a viral process causing some swelling  Plan:      PLAN:  1.  Left ear canal ceruminosis removed by clinical assistant with usual method of water and hydrogen peroxide irrigation.  2.  Have the patient use Flonase nasal spray 1 spray to the left nostril daily, told the patient may take several weeks or up to a month for this to clear, and otherwise follow-up as needed.                Subjective   Dominique is a 24 year old, presenting for the following health issues:  Left Nostril Problem        8/21/2024     8:50 AM   Additional Questions   Roomed by Karolina NATH   Accompanied by Children     History of Present Illness       Reason for visit:  Abnormal growth inside left nostril. Bothersome (doesnt hurt), stuffy nose feeling  Symptom onset:  3-7 days ago  Symptoms include:  Feeling of nostril being clogged by something. Sometimes makes it difficult to breathe out that nostril. Sometimes makes my ear feel pressure/like its plugged  Symptom intensity:  Mild  Symptom progression:  Worsening  Had these symptoms before:  No  What makes it worse:  No  What makes it better:  No    She eats 0-1 servings of fruits and vegetables daily.She consumes 5 sweetened beverage(s) daily.She exercises with enough effort to increase her heart rate 9 or less minutes per day.  She exercises with enough effort to increase her heart rate 3 or less days per week. She is missing 1 dose(s) of medications per week.  She is not taking prescribed medications regularly due to remembering to take.     Patient comes in because she has noticed actually fairly recently a nasal lesion on the left side she actually has  "a picture of it, which is very small looks like 3 to 4 mm circular red lesion, it looks like a nasal polyp and indeed the nasal mucosa on the left is more swollen than on the right.  She is feeling a little congested but she does not have a diagnosis of hayfever she is not feeling sick.  She does feel a fullness in the left ear though she also has impacted cerumen.    I discussed with the patient that I suspect that she has a nasal polyp and nasal swelling secondary to some type of either virus or perhaps underlying allergies or pollen in either case I think we can shrink this with Flonase and I like to irrigate the left ear canal as well.                      Objective    /64   Pulse 90   Temp 98.1  F (36.7  C)   Ht 1.626 m (5' 4\")   Wt 58.5 kg (129 lb)   LMP 08/01/2024 (Approximate)   SpO2 100%   BMI 22.14 kg/m    Body mass index is 22.14 kg/m .  Physical Exam   ENT examination the right tympanic membrane is normal in appearance left tympanic membrane is not at all visible due to impacted dried cerumen.  The right nasal passage is normal in appearance the left nasal mucosa is diffusely swollen.              Signed Electronically by: Og Manzano MD    "

## 2024-08-21 NOTE — NURSING NOTE
Patient identified using two patient identifiers.  Ear exam showing wax occlusion completed by provider.  Solution: warm water was placed in the left ear(s) via irrigation tool: elephant ear.

## 2024-09-04 NOTE — PROGRESS NOTES
Dominique June      1.  Has the patient received the information for the influenza vaccine? YES    2.  Does the patient have any of the following contraindications?     Allergy to eggs? No     Allergic reaction to previous influenza vaccines? No     Any other problems to previous influenza vaccines? No     Paralyzed by Guillain-[place] syndrome? No     Currently pregnant? NO     Current moderate or severe illness? No     Allergy to contact lens solution? No    3.  The vaccine has been administered in the usual fashion and the patient was instructed to wait 20 minutes before leaving the building in the event of an allergic reaction: YES    Vaccination given by Deanna Mcdermott RN.  Recorded by Deanna Mcdermott RN       Foot osteomyelitis, right Foot osteomyelitis, right

## 2024-10-19 ENCOUNTER — E-VISIT (OUTPATIENT)
Dept: URGENT CARE | Facility: CLINIC | Age: 24
End: 2024-10-19
Payer: COMMERCIAL

## 2024-10-19 DIAGNOSIS — H92.09 OTALGIA, UNSPECIFIED LATERALITY: Primary | ICD-10-CM

## 2024-10-19 PROCEDURE — 99207 PR NON-BILLABLE SERV PER CHARTING: CPT | Performed by: INTERNAL MEDICINE

## 2024-10-19 NOTE — PATIENT INSTRUCTIONS
"Dear Dominique June,    We are sorry you are not feeling well. Based on the responses you provided, it is recommended that you be seen in-person in urgent care so we can better evaluate your symptoms as we need to do an ear exam to be sure we have the correct diagnosis and treatment for you as there are several things that could cause these symptoms.     Please click here to find the nearest urgent care location to you. Often you can check in for urgent care from home using the \"on my way\" feature, which will give you an approximate time and reduce your wait in the clinic.  I know it can be tough to get to the clinic when you've got little ones, but the \"on my way\" option should help some.    You will not be charged for this EVisit. Thank you for trusting us with your care.    Hope Ventura MD    "

## 2024-10-22 ENCOUNTER — ANCILLARY PROCEDURE (OUTPATIENT)
Dept: GENERAL RADIOLOGY | Facility: CLINIC | Age: 24
End: 2024-10-22
Attending: PHYSICIAN ASSISTANT
Payer: COMMERCIAL

## 2024-10-22 ENCOUNTER — OFFICE VISIT (OUTPATIENT)
Dept: FAMILY MEDICINE | Facility: CLINIC | Age: 24
End: 2024-10-22
Payer: COMMERCIAL

## 2024-10-22 VITALS
SYSTOLIC BLOOD PRESSURE: 116 MMHG | OXYGEN SATURATION: 98 % | BODY MASS INDEX: 22.71 KG/M2 | TEMPERATURE: 98.3 F | HEIGHT: 64 IN | HEART RATE: 77 BPM | RESPIRATION RATE: 16 BRPM | DIASTOLIC BLOOD PRESSURE: 72 MMHG | WEIGHT: 133 LBS

## 2024-10-22 DIAGNOSIS — R06.2 WHEEZING: Primary | ICD-10-CM

## 2024-10-22 DIAGNOSIS — R05.9 COUGH, UNSPECIFIED TYPE: ICD-10-CM

## 2024-10-22 DIAGNOSIS — R06.2 WHEEZING: ICD-10-CM

## 2024-10-22 PROCEDURE — 99214 OFFICE O/P EST MOD 30 MIN: CPT | Performed by: PHYSICIAN ASSISTANT

## 2024-10-22 PROCEDURE — 71046 X-RAY EXAM CHEST 2 VIEWS: CPT | Mod: TC | Performed by: RADIOLOGY

## 2024-10-22 RX ORDER — ESCITALOPRAM OXALATE 20 MG/1
20 TABLET ORAL DAILY
COMMUNITY
Start: 2024-09-30

## 2024-10-22 RX ORDER — ALBUTEROL SULFATE 90 UG/1
2 INHALANT RESPIRATORY (INHALATION) EVERY 6 HOURS PRN
Qty: 18 G | Refills: 0 | Status: SHIPPED | OUTPATIENT
Start: 2024-10-22 | End: 2024-11-13

## 2024-10-22 RX ORDER — BUSPIRONE HYDROCHLORIDE 15 MG/1
15 TABLET ORAL 2 TIMES DAILY
COMMUNITY
Start: 2024-09-30

## 2024-10-22 RX ORDER — AZITHROMYCIN 250 MG/1
TABLET, FILM COATED ORAL
Qty: 6 TABLET | Refills: 0 | Status: SHIPPED | OUTPATIENT
Start: 2024-10-22 | End: 2024-10-27

## 2024-10-22 RX ORDER — DOXYCYCLINE 100 MG/1
100 CAPSULE ORAL 2 TIMES DAILY
Qty: 14 CAPSULE | Refills: 0 | Status: CANCELLED | OUTPATIENT
Start: 2024-10-22 | End: 2024-10-29

## 2024-10-22 RX ORDER — HYDROXYZINE HYDROCHLORIDE 50 MG/1
50 TABLET, FILM COATED ORAL EVERY 6 HOURS PRN
COMMUNITY
Start: 2024-07-29

## 2024-10-22 ASSESSMENT — ENCOUNTER SYMPTOMS
ABDOMINAL DISTENTION: 0
APPETITE CHANGE: 0
CONSTIPATION: 0
CHILLS: 0
SHORTNESS OF BREATH: 0
FATIGUE: 1
ARTHRALGIAS: 0
SEIZURES: 0
SINUS PRESSURE: 1
HEMATURIA: 0
COUGH: 1
PALPITATIONS: 0
FEVER: 0
DIARRHEA: 0
BACK PAIN: 0
DYSURIA: 0
WHEEZING: 1
EYE PAIN: 0
SORE THROAT: 1
ACTIVITY CHANGE: 0
COLOR CHANGE: 0
ABDOMINAL PAIN: 0
NAUSEA: 0
VOMITING: 0
CHOKING: 0
DIZZINESS: 0

## 2024-10-22 NOTE — PROGRESS NOTES
Assessment & Plan     Cough, unspecified type  Wheezing  1 week of cough, nasal congestion, bilateral ear pain and wheezing.  Daughter diagnosed with pneumonia yesterday and was started on antibiotics.  No fevers or chills.  Vitals are stable.  She does not appear toxic.  On exam she does have wheezing to upper and lower left lungs.  Due to the wheezing we will get a chest x-ray today to rule out pneumonia.  Will treat her with azithromycin along with albuterol inhaler.  She is to continue with cough and cold medications over-the-counter.  Push fluids and get plenty of rest.  Tylenol and ibuprofen for fevers aches and pains.  Symptoms for prompt reevaluation were discussed.  She has to follow-up if symptoms worsen or do not improve.  Patient agreed this plan.  Questions were answered  - XR Chest 2 Views; Future  - albuterol (PROAIR HFA/PROVENTIL HFA/VENTOLIN HFA) 108 (90 Base) MCG/ACT inhaler; Inhale 2 puffs into the lungs every 6 hours as needed for shortness of breath, wheezing or cough.  - azithromycin (ZITHROMAX) 250 MG tablet; Take 2 tablets (500 mg) by mouth daily for 1 day, THEN 1 tablet (250 mg) daily for 4 days.          Subjective   Dominiqeu is a 24 year old, presenting for the following health issues:  URI (Productive cough that is thick and yellow, wheezing, sore throat, shallow breathing and L ear pain with L sinus pressure and headache x 1 week. Daughter was dx with pna yesterday, 10/21/24.)        10/22/2024    11:11 AM   Additional Questions   Roomed by Abby Uriarte   Accompanied by javon     Patricia is a 24-year-old female who presents to the clinic today for 1 week of productive cough, nasal congestion, bilateral ear pain, and wheezy.  She has not had any fevers or chills.  She states that her cough is productive and yellow in color.  She states that she feels wheezy when she is laying flat or earlier in the mornings.  She states that her daughter was seen yesterday and was diagnosed with pneumonia  "and was started on antibiotics.  He is not having any abdominal pain.  No nausea, vomiting, or diarrhea.  Appetite is decreased but she has been eating well.  She does not appear toxic in the clinic vitals are stable.    History of Present Illness       Reason for visit:  Cough/congestion, sore throat & ear pain  Symptom onset:  3-7 days ago  Symptoms include:  Productive cough with thick yellow mucus, nasal congestion, throat pain, ear pain/pressure  Symptom intensity:  Moderate  Symptom progression:  Worsening  Had these symptoms before:  No  What makes it worse:  Laying down at night  What makes it better:  Ibuprofen She is missing 1 dose(s) of medications per week.  She is not taking prescribed medications regularly due to remembering to take.         Review of Systems   Constitutional:  Positive for fatigue. Negative for activity change, appetite change, chills and fever.   HENT:  Positive for congestion, ear pain, postnasal drip, sinus pressure and sore throat.    Eyes:  Negative for pain and visual disturbance.   Respiratory:  Positive for cough and wheezing. Negative for choking and shortness of breath.    Cardiovascular:  Negative for chest pain and palpitations.   Gastrointestinal:  Negative for abdominal distention, abdominal pain, constipation, diarrhea, nausea and vomiting.   Genitourinary:  Negative for dysuria and hematuria.   Musculoskeletal:  Negative for arthralgias and back pain.   Skin:  Negative for color change and rash.   Neurological:  Negative for dizziness, seizures and syncope.   All other systems reviewed and are negative.          Objective    /72 (BP Location: Left arm, Patient Position: Sitting, Cuff Size: Adult Regular)   Pulse 77   Temp 98.3  F (36.8  C) (Oral)   Resp 16   Ht 1.626 m (5' 4\")   Wt 60.3 kg (133 lb)   LMP 10/02/2024 (Exact Date)   SpO2 98%   Breastfeeding No   BMI 22.83 kg/m    Body mass index is 22.83 kg/m .  Physical Exam  Constitutional:       General: " She is not in acute distress.     Appearance: Normal appearance. She is not ill-appearing, toxic-appearing or diaphoretic.   HENT:      Head: Normocephalic and atraumatic.      Right Ear: Tympanic membrane, ear canal and external ear normal.      Left Ear: Tympanic membrane, ear canal and external ear normal.      Nose: Rhinorrhea present.      Mouth/Throat:      Mouth: Mucous membranes are moist.   Eyes:      Conjunctiva/sclera: Conjunctivae normal.   Cardiovascular:      Rate and Rhythm: Normal rate and regular rhythm.      Heart sounds: No murmur heard.     No friction rub. No gallop.   Pulmonary:      Effort: No accessory muscle usage, prolonged expiration or respiratory distress.      Breath sounds: Examination of the left-upper field reveals wheezing. Examination of the left-lower field reveals wheezing. Wheezing present. No decreased breath sounds or rhonchi.   Abdominal:      General: Abdomen is flat.      Tenderness: There is no abdominal tenderness. There is no guarding or rebound.   Skin:     General: Skin is warm and dry.   Neurological:      General: No focal deficit present.      Mental Status: She is alert.      Motor: No weakness.   Psychiatric:         Mood and Affect: Mood normal.         Behavior: Behavior normal.         Thought Content: Thought content normal.         Judgment: Judgment normal.               Signed Electronically by: Bobby Fisher PA-C

## 2024-11-13 DIAGNOSIS — R06.2 WHEEZING: ICD-10-CM

## 2024-11-13 RX ORDER — ALBUTEROL SULFATE 90 UG/1
AEROSOL, METERED RESPIRATORY (INHALATION)
Qty: 18 G | Refills: 0 | Status: SHIPPED | OUTPATIENT
Start: 2024-11-13

## 2025-03-25 DIAGNOSIS — Z78.9 USES HORMONAL CONTRACEPTIVE PATCH AS PRIMARY BIRTH CONTROL METHOD: ICD-10-CM

## 2025-03-26 ENCOUNTER — MYC REFILL (OUTPATIENT)
Dept: FAMILY MEDICINE | Facility: CLINIC | Age: 25
End: 2025-03-26
Payer: COMMERCIAL

## 2025-03-26 DIAGNOSIS — Z78.9 USES HORMONAL CONTRACEPTIVE PATCH AS PRIMARY BIRTH CONTROL METHOD: ICD-10-CM

## 2025-03-26 RX ORDER — NORELGESTROMIN AND ETHINYL ESTRADIOL 35; 150 UG/MG; UG/MG
PATCH TRANSDERMAL
Qty: 2 PATCH | Refills: 0 | Status: SHIPPED | OUTPATIENT
Start: 2025-03-26

## 2025-03-26 RX ORDER — NORELGESTROMIN AND ETHINYL ESTRADIOL 35; 150 UG/D; UG/D
PATCH TRANSDERMAL
Qty: 12 PATCH | OUTPATIENT
Start: 2025-03-26

## 2025-03-26 NOTE — TELEPHONE ENCOUNTER
Spoke with the patient. She reports that she had an insurance issue so she was off the patch for a while, but things should be good now so she would like them to be prescribed again. Routing to the patient's PCP.    Maggy Mar RN  [facility]

## 2025-03-26 NOTE — TELEPHONE ENCOUNTER
The patient was called and the provider's message was relayed. She verbalized understanding.     Maggy Mar RN  [facility]

## 2025-03-26 NOTE — TELEPHONE ENCOUNTER
Spoke with patient. She was out running errands and stated she will use MyChart to get an appointment scheduled with PCP.

## 2025-04-08 ENCOUNTER — OFFICE VISIT (OUTPATIENT)
Dept: FAMILY MEDICINE | Facility: CLINIC | Age: 25
End: 2025-04-08
Payer: COMMERCIAL

## 2025-04-08 VITALS
BODY MASS INDEX: 23.46 KG/M2 | SYSTOLIC BLOOD PRESSURE: 120 MMHG | OXYGEN SATURATION: 99 % | RESPIRATION RATE: 16 BRPM | HEART RATE: 86 BPM | HEIGHT: 65 IN | DIASTOLIC BLOOD PRESSURE: 62 MMHG | WEIGHT: 140.8 LBS | TEMPERATURE: 98.2 F

## 2025-04-08 DIAGNOSIS — Z30.430 ENCOUNTER FOR INSERTION OF KYLEENA IUD: ICD-10-CM

## 2025-04-08 DIAGNOSIS — Z13.220 LIPID SCREENING: ICD-10-CM

## 2025-04-08 DIAGNOSIS — Z87.898 HISTORY OF BRADYCARDIA: ICD-10-CM

## 2025-04-08 DIAGNOSIS — Z78.9 USES HORMONAL CONTRACEPTIVE PATCH AS PRIMARY BIRTH CONTROL METHOD: ICD-10-CM

## 2025-04-08 DIAGNOSIS — F17.200 TOBACCO DEPENDENCE SYNDROME: ICD-10-CM

## 2025-04-08 DIAGNOSIS — R06.2 WHEEZING: ICD-10-CM

## 2025-04-08 DIAGNOSIS — R42 DIZZINESS: ICD-10-CM

## 2025-04-08 DIAGNOSIS — Z00.00 ROUTINE GENERAL MEDICAL EXAMINATION AT A HEALTH CARE FACILITY: Primary | ICD-10-CM

## 2025-04-08 LAB
ALBUMIN SERPL BCG-MCNC: 4.6 G/DL (ref 3.5–5.2)
ALP SERPL-CCNC: 69 U/L (ref 40–150)
ALT SERPL W P-5'-P-CCNC: 9 U/L (ref 0–50)
ANION GAP SERPL CALCULATED.3IONS-SCNC: 11 MMOL/L (ref 7–15)
AST SERPL W P-5'-P-CCNC: 20 U/L (ref 0–45)
ATRIAL RATE - MUSE: 63 BPM
BILIRUB SERPL-MCNC: 0.2 MG/DL
BUN SERPL-MCNC: 10.2 MG/DL (ref 6–20)
CALCIUM SERPL-MCNC: 9.9 MG/DL (ref 8.8–10.4)
CHLORIDE SERPL-SCNC: 104 MMOL/L (ref 98–107)
CHOLEST SERPL-MCNC: 212 MG/DL
CREAT SERPL-MCNC: 0.81 MG/DL (ref 0.51–0.95)
DIASTOLIC BLOOD PRESSURE - MUSE: NORMAL MMHG
EGFRCR SERPLBLD CKD-EPI 2021: >90 ML/MIN/1.73M2
ERYTHROCYTE [DISTWIDTH] IN BLOOD BY AUTOMATED COUNT: 12.3 % (ref 10–15)
FASTING STATUS PATIENT QL REPORTED: ABNORMAL
FASTING STATUS PATIENT QL REPORTED: NORMAL
GLUCOSE SERPL-MCNC: 94 MG/DL (ref 70–99)
HCO3 SERPL-SCNC: 24 MMOL/L (ref 22–29)
HCT VFR BLD AUTO: 40.1 % (ref 35–47)
HDLC SERPL-MCNC: 57 MG/DL
HGB BLD-MCNC: 14 G/DL (ref 11.7–15.7)
INTERPRETATION ECG - MUSE: NORMAL
LDLC SERPL CALC-MCNC: 115 MG/DL
MCH RBC QN AUTO: 31.7 PG (ref 26.5–33)
MCHC RBC AUTO-ENTMCNC: 34.9 G/DL (ref 31.5–36.5)
MCV RBC AUTO: 91 FL (ref 78–100)
NONHDLC SERPL-MCNC: 155 MG/DL
P AXIS - MUSE: 19 DEGREES
PLATELET # BLD AUTO: 294 10E3/UL (ref 150–450)
POTASSIUM SERPL-SCNC: 4.4 MMOL/L (ref 3.4–5.3)
PR INTERVAL - MUSE: 114 MS
PROT SERPL-MCNC: 7.7 G/DL (ref 6.4–8.3)
QRS DURATION - MUSE: 84 MS
QT - MUSE: 398 MS
QTC - MUSE: 407 MS
R AXIS - MUSE: 70 DEGREES
RBC # BLD AUTO: 4.42 10E6/UL (ref 3.8–5.2)
SODIUM SERPL-SCNC: 139 MMOL/L (ref 135–145)
SYSTOLIC BLOOD PRESSURE - MUSE: NORMAL MMHG
T AXIS - MUSE: 53 DEGREES
TRIGL SERPL-MCNC: 201 MG/DL
TSH SERPL DL<=0.005 MIU/L-ACNC: 4.12 UIU/ML (ref 0.3–4.2)
VENTRICULAR RATE- MUSE: 63 BPM
WBC # BLD AUTO: 11 10E3/UL (ref 4–11)

## 2025-04-08 PROCEDURE — 84443 ASSAY THYROID STIM HORMONE: CPT | Performed by: NURSE PRACTITIONER

## 2025-04-08 PROCEDURE — 1126F AMNT PAIN NOTED NONE PRSNT: CPT | Performed by: NURSE PRACTITIONER

## 2025-04-08 PROCEDURE — 99214 OFFICE O/P EST MOD 30 MIN: CPT | Mod: 25 | Performed by: NURSE PRACTITIONER

## 2025-04-08 PROCEDURE — 36415 COLL VENOUS BLD VENIPUNCTURE: CPT | Performed by: NURSE PRACTITIONER

## 2025-04-08 PROCEDURE — 99395 PREV VISIT EST AGE 18-39: CPT | Performed by: NURSE PRACTITIONER

## 2025-04-08 PROCEDURE — 3074F SYST BP LT 130 MM HG: CPT | Performed by: NURSE PRACTITIONER

## 2025-04-08 PROCEDURE — 3078F DIAST BP <80 MM HG: CPT | Performed by: NURSE PRACTITIONER

## 2025-04-08 PROCEDURE — 80053 COMPREHEN METABOLIC PANEL: CPT | Performed by: NURSE PRACTITIONER

## 2025-04-08 PROCEDURE — 80061 LIPID PANEL: CPT | Performed by: NURSE PRACTITIONER

## 2025-04-08 PROCEDURE — 93005 ELECTROCARDIOGRAM TRACING: CPT | Performed by: NURSE PRACTITIONER

## 2025-04-08 PROCEDURE — 85027 COMPLETE CBC AUTOMATED: CPT | Performed by: NURSE PRACTITIONER

## 2025-04-08 PROCEDURE — 93010 ELECTROCARDIOGRAM REPORT: CPT | Performed by: INTERNAL MEDICINE

## 2025-04-08 RX ORDER — NORELGESTROMIN AND ETHINYL ESTRADIOL 35; 150 UG/MG; UG/MG
PATCH TRANSDERMAL
Qty: 3 PATCH | Refills: 1 | Status: SHIPPED | OUTPATIENT
Start: 2025-04-08

## 2025-04-08 RX ORDER — ALBUTEROL SULFATE 90 UG/1
1-2 INHALANT RESPIRATORY (INHALATION) EVERY 4 HOURS PRN
Qty: 18 G | Refills: 3 | Status: SHIPPED | OUTPATIENT
Start: 2025-04-08

## 2025-04-08 SDOH — HEALTH STABILITY: PHYSICAL HEALTH: ON AVERAGE, HOW MANY DAYS PER WEEK DO YOU ENGAGE IN MODERATE TO STRENUOUS EXERCISE (LIKE A BRISK WALK)?: 2 DAYS

## 2025-04-08 ASSESSMENT — ANXIETY QUESTIONNAIRES
7. FEELING AFRAID AS IF SOMETHING AWFUL MIGHT HAPPEN: MORE THAN HALF THE DAYS
5. BEING SO RESTLESS THAT IT IS HARD TO SIT STILL: MORE THAN HALF THE DAYS
GAD7 TOTAL SCORE: 12
6. BECOMING EASILY ANNOYED OR IRRITABLE: MORE THAN HALF THE DAYS
GAD7 TOTAL SCORE: 12
8. IF YOU CHECKED OFF ANY PROBLEMS, HOW DIFFICULT HAVE THESE MADE IT FOR YOU TO DO YOUR WORK, TAKE CARE OF THINGS AT HOME, OR GET ALONG WITH OTHER PEOPLE?: SOMEWHAT DIFFICULT
4. TROUBLE RELAXING: SEVERAL DAYS
2. NOT BEING ABLE TO STOP OR CONTROL WORRYING: MORE THAN HALF THE DAYS
7. FEELING AFRAID AS IF SOMETHING AWFUL MIGHT HAPPEN: MORE THAN HALF THE DAYS
IF YOU CHECKED OFF ANY PROBLEMS ON THIS QUESTIONNAIRE, HOW DIFFICULT HAVE THESE PROBLEMS MADE IT FOR YOU TO DO YOUR WORK, TAKE CARE OF THINGS AT HOME, OR GET ALONG WITH OTHER PEOPLE: SOMEWHAT DIFFICULT
3. WORRYING TOO MUCH ABOUT DIFFERENT THINGS: MORE THAN HALF THE DAYS
GAD7 TOTAL SCORE: 12
1. FEELING NERVOUS, ANXIOUS, OR ON EDGE: SEVERAL DAYS

## 2025-04-08 ASSESSMENT — PATIENT HEALTH QUESTIONNAIRE - PHQ9
10. IF YOU CHECKED OFF ANY PROBLEMS, HOW DIFFICULT HAVE THESE PROBLEMS MADE IT FOR YOU TO DO YOUR WORK, TAKE CARE OF THINGS AT HOME, OR GET ALONG WITH OTHER PEOPLE: SOMEWHAT DIFFICULT
SUM OF ALL RESPONSES TO PHQ QUESTIONS 1-9: 15
SUM OF ALL RESPONSES TO PHQ QUESTIONS 1-9: 15

## 2025-04-08 ASSESSMENT — SOCIAL DETERMINANTS OF HEALTH (SDOH): HOW OFTEN DO YOU GET TOGETHER WITH FRIENDS OR RELATIVES?: ONCE A WEEK

## 2025-04-08 ASSESSMENT — PAIN SCALES - GENERAL: PAINLEVEL_OUTOF10: NO PAIN (0)

## 2025-04-08 NOTE — LETTER
My Depression Action Plan  Name: Dominique June   Date of Birth 2000  Date: 4/8/2025    My doctor: Tiffanie Cohen   My clinic: [facility] [place]  [address], [address]  [place] [address]  [phone]            GREEN    ZONE   Good Control    What it looks like:   Things are going generally well. You have normal ups and downs. You may even feel depressed from time to time, but bad moods usually last less than a day.   What you need to do:  Continue to care for yourself (see self care plan)  Check your depression survival kit and update it as needed  Follow your physician s recommendations including any medication.  Do not stop taking medication unless you consult with your physician first.             YELLOW         ZONE Getting Worse    What it looks like:   Depression is starting to interfere with your life.   It may be hard to get out of bed; you may be starting to isolate yourself from others.  Symptoms of depression are starting to last most all day and this has happened for several days.   You may have suicidal thoughts but they are not constant.   What you need to do:     Call your care team. Your response to treatment will improve if you keep your care team informed of your progress. Yellow periods are signs an adjustment may need to be made.     Continue your self-care.  Just get dressed and ready for the day.  Don't give yourself time to talk yourself out of it.    Talk to someone in your support network.    Open up your Depression Self-Care Plan/Wellness Kit.             RED    ZONE Medical Alert - Get Help    What it looks like:   Depression is seriously interfering with your life.   You may experience these or other symptoms: You can t get out of bed most days, can t work or engage in other necessary activities, you have trouble taking care of basic hygiene, or basic responsibilities, thoughts of suicide or death that  will not go away, self-injurious behavior.     What you need to do:  Call your care team and request a same-day appointment. If they are not available (weekends or after hours) call your local crisis line, emergency room or 911.          Depression Self-Care Plan / Wellness Kit    Many people find that medication and therapy are helpful treatments for managing depression. In addition, making small changes to your everyday life can help to boost your mood and improve your wellbeing. Below are some tips for you to consider. Be sure to talk with your medical provider and/or behavioral health consultant if your symptoms are worsening or not improving.     Sleep   Sleep hygiene  means all of the habits that support good, restful sleep. It includes maintaining a consistent bedtime and wake time, using your bedroom only for sleeping or sex, and keeping the bedroom dark and free of distractions like a computer, smartphone, or television.     Develop a Healthy Routine  Maintain good hygiene. Get out of bed in the morning, make your bed, brush your teeth, take a shower, and get dressed. Don t spend too much time viewing media that makes you feel stressed. Find time to relax each day.    Exercise  Get some form of exercise every day. This will help reduce pain and release endorphins, the  feel good  chemicals in your brain. It can be as simple as just going for a walk or doing some gardening, anything that will get you moving.      Diet  Strive to eat healthy foods, including fruits and vegetables. Drink plenty of water. Avoid excessive sugar, caffeine, alcohol, and other mood-altering substances.     Stay Connected with Others  Stay in touch with friends and family members.    Manage Your Mood  Try deep breathing, massage therapy, biofeedback, or meditation. Take part in fun activities when you can. Try to find something to smile about each day.     Psychotherapy  Be open to working with a therapist if your provider  recommends it.     Medication  Be sure to take your medication as prescribed. Most anti-depressants need to be taken every day. It usually takes several weeks for medications to work. Not all medicines work for all people. It is important to follow-up with your provider to make sure you have a treatment plan that is working for you. Do not stop your medication abruptly without first discussing it with your provider.    Crisis Resources   These hotlines are for both adults and children. They and are open 24 hours a day, 7 days a week unless noted otherwise.    National Suicide Prevention Lifeline   988 or 5-957-343-ZXTT (3021)    Crisis Text Line    www.crisistextline.org  Text HOME to 943801 from anywhere in the [place], anytime, about any type of crisis. A live, trained crisis counselor will receive the text and respond quickly.    [place] Lifeline for LGBTQ Youth  A national crisis intervention and suicide lifeline for LGBTQ youth under 25. Provides a safe place to talk without judgement. Call 1-964.616.4037; text START to 804114 or visit www.thetrevorproject.org to talk to a trained counselor.    For [facility] crisis numbers, visit the [facility] website at:  https://[place].gov/dhs/people-we-serve/adults/health-care/mental-health/resources/crisis-contacts.jsp

## 2025-04-08 NOTE — PROGRESS NOTES
"    Shahida Fox is a 25 year old, presenting for the following health issues:  Physical (Medication refills  Birth Control Medication)        4/8/2025    11:11 AM   Additional Questions   Roomed by  LPN     Healthy Habits:     Getting at least 3 servings of Calcium per day:  NO    Bi-annual eye exam:  NO    Dental care twice a year:  NO    Sleep apnea or symptoms of sleep apnea:  None    Diet:  Low fat/cholesterol and Carbohydrate counting    Frequency of exercise:  None    Duration of exercise:  N/A    Taking medications regularly:  Yes    Barriers to taking medications:  None    Medication side effects:  None    Additional concerns today:  Yes (IUD Placed)          Review of Systems  Constitutional, HEENT, cardiovascular, pulmonary, GI, , musculoskeletal, neuro, skin, endocrine and psych systems are negative, except as otherwise noted.  For about the past 3 months has been having dizzy spells with position changes- when first stands feels like passing out.  States she had some heart testing done about 5 years ago in [place]- was told she has positional bradycardia of some sort.  Was fine for years but symptoms returned within the past few months.  Would like to get an IUD placed in the near future.        Objective    /62 (BP Location: Left arm, Patient Position: Sitting, Cuff Size: Adult Regular)   Pulse 86   Temp 98.2  F (36.8  C) (Oral)   Resp 16   Ht 1.638 m (5' 4.5\")   Wt 63.9 kg (140 lb 12.8 oz)   LMP 02/28/2025   SpO2 99%   Breastfeeding No   BMI 23.80 kg/m    Body mass index is 23.8 kg/m .  Physical Exam   GENERAL: alert and no distress  EYES: Eyes grossly normal to inspection, PERRL and conjunctivae and sclerae normal  HENT: ear canals and TM's normal, nose and mouth without ulcers or lesions  NECK: no adenopathy, no asymmetry, masses, or scars  RESP: lungs clear to auscultation - no rales, rhonchi or wheezes  BREAST: normal without masses, tenderness or nipple discharge and " no palpable axillary masses or adenopathy  CV: regular rate and rhythm, normal S1 S2, no S3 or S4, no murmur, click or rub, no peripheral edema  ABDOMEN: soft, nontender, no hepatosplenomegaly, no masses and bowel sounds normal  MS: no gross musculoskeletal defects noted, no edema  SKIN: no suspicious lesions or rashes  NEURO: Normal strength and tone, mentation intact and speech normal  PSYCH: mentation appears normal, affect normal/bright    EKG - Reviewed and interpreted by me appears normal, NSR, normal axis, normal intervals, no acute ST/T changes c/w ischemia, no LVH by voltage criteria      A/P:  1. Routine general medical examination at a health care facility (Primary)    2. Dizziness  - TSH with free T4 reflex; Future  - CBC with platelets; Future  - Adult Holter Monitor 24 hour; Future  - Adult Cardiology Eval  Referral; Future  - Comprehensive metabolic panel (BMP + Alb, Alk Phos, ALT, AST, Total. Bili, TP); Future  - EKG 12-lead, tracing only  - TSH with free T4 reflex  - CBC with platelets  - Comprehensive metabolic panel (BMP + Alb, Alk Phos, ALT, AST, Total. Bili, TP)    3. Uses hormonal contraceptive patch as primary birth control method  - norelgestromin-ethinyl estradiol (ORTHO EVRA) 150-35 MCG/24HR patch; Remove old patch and apply new patch onto the skin once a week for 3 weeks (21 days). Do not wear patch week 4 (days 22-28), then repeat.  Dispense: 3 patch; Refill: 1    4. Encounter for insertion of Kyleena IUD  - Ob/Gyn  Referral; Future    5. Wheezing  - albuterol (VENTOLIN HFA) 108 (90 Base) MCG/ACT inhaler; Inhale 1-2 puffs into the lungs every 4 hours as needed for shortness of breath, wheezing or cough.  Dispense: 18 g; Refill: 3    6. Tobacco dependence syndrome  - Quit Partner (Tobacco Cessation) Referral; Future    7. History of bradycardia  - Adult Cardiology Eval  Referral; Future    8. Lipid screening  - Lipid panel reflex to direct LDL Fasting; Future  -  Lipid panel reflex to direct LDL Fasting    Signed Electronically by: Tiffanie Cohen, CNP    Answers submitted by the patient for this visit:  Patient Health Questionnaire (Submitted on 4/8/2025)  If you checked off any problems, how difficult have these problems made it for you to do your work, take care of things at home, or get along with other people?: Somewhat difficult  PHQ9 TOTAL SCORE: 15  Patient Health Questionnaire (G7) (Submitted on 4/8/2025)  JASSON 7 TOTAL SCORE: 12

## 2025-04-08 NOTE — PATIENT INSTRUCTIONS
Patient Education   Preventive Care Advice   This is general advice given by our system to help you stay healthy. However, your care team may have specific advice just for you. Please talk to your care team about your preventive care needs.  Nutrition  Eat 5 or more servings of fruits and vegetables each day.  Try wheat bread, brown rice and whole grain pasta (instead of white bread, rice, and pasta).  Get enough calcium and vitamin D. Check the label on foods and aim for 100% of the RDA (recommended daily allowance).  Lifestyle  Exercise at least 150 minutes each week  (30 minutes a day, 5 days a week).  Do muscle strengthening activities 2 days a week. These help control your weight and prevent disease.  No smoking.  Wear sunscreen to prevent skin cancer.  Have a dental exam and cleaning every 6 months.  Yearly exams  See your health care team every year to talk about:  Any changes in your health.  Any medicines your care team has prescribed.  Preventive care, family planning, and ways to prevent chronic diseases.  Shots (vaccines)   HPV shots (up to age 26), if you've never had them before.  Hepatitis B shots (up to age 59), if you've never had them before.  COVID-19 shot: Get this shot when it's due.  Flu shot: Get a flu shot every year.  Tetanus shot: Get a tetanus shot every 10 years.  Pneumococcal, hepatitis A, and RSV shots: Ask your care team if you need these based on your risk.  Shingles shot (for age 50 and up)  General health tests  Diabetes screening:  Starting at age 35, Get screened for diabetes at least every 3 years.  If you are younger than age 35, ask your care team if you should be screened for diabetes.  Cholesterol test: At age 39, start having a cholesterol test every 5 years, or more often if advised.  Bone density scan (DEXA): At age 50, ask your care team if you should have this scan for osteoporosis (brittle bones).  Hepatitis C: Get tested at least once in your life.  STIs (sexually  transmitted infections)  Before age 24: Ask your care team if you should be screened for STIs.  After age 24: Get screened for STIs if you're at risk. You are at risk for STIs (including HIV) if:  You are sexually active with more than one person.  You don't use condoms every time.  You or a partner was diagnosed with a sexually transmitted infection.  If you are at risk for HIV, ask about PrEP medicine to prevent HIV.  Get tested for HIV at least once in your life, whether you are at risk for HIV or not.  Cancer screening tests  Cervical cancer screening: If you have a cervix, begin getting regular cervical cancer screening tests starting at age 21.  Breast cancer scan (mammogram): If you've ever had breasts, begin having regular mammograms starting at age 40. This is a scan to check for breast cancer.  Colon cancer screening: It is important to start screening for colon cancer at age 45.  Have a colonoscopy test every 10 years (or more often if you're at risk) Or, ask your provider about stool tests like a FIT test every year or Cologuard test every 3 years.  To learn more about your testing options, visit:   .  For help making a decision, visit:   https://bit.ly/uu73300.  Prostate cancer screening test: If you have a prostate, ask your care team if a prostate cancer screening test (PSA) at age 55 is right for you.  Lung cancer screening: If you are a current or former smoker ages 50 to 80, ask your care team if ongoing lung cancer screenings are right for you.  For informational purposes only. Not to replace the advice of your health care provider. Copyright   2023 [employer]. All rights reserved. Clinically reviewed by the [facility] Transitions Program. [employer] 618444 - REV 01/24.

## 2025-04-09 ENCOUNTER — HOSPITAL ENCOUNTER (OUTPATIENT)
Dept: CARDIOLOGY | Facility: CLINIC | Age: 25
Discharge: HOME | End: 2025-04-09
Attending: NURSE PRACTITIONER
Payer: COMMERCIAL

## 2025-04-09 DIAGNOSIS — R42 DIZZINESS: ICD-10-CM

## 2025-04-09 PROCEDURE — 93226 XTRNL ECG REC<48 HR SCAN A/R: CPT

## 2025-04-09 PROCEDURE — 93225 XTRNL ECG REC<48 HRS REC: CPT

## 2025-04-22 ENCOUNTER — OFFICE VISIT (OUTPATIENT)
Dept: OBGYN | Facility: CLINIC | Age: 25
End: 2025-04-22
Payer: COMMERCIAL

## 2025-04-22 VITALS
WEIGHT: 138.3 LBS | OXYGEN SATURATION: 100 % | TEMPERATURE: 97.4 F | HEART RATE: 80 BPM | SYSTOLIC BLOOD PRESSURE: 115 MMHG | BODY MASS INDEX: 23.61 KG/M2 | HEIGHT: 64 IN | DIASTOLIC BLOOD PRESSURE: 67 MMHG

## 2025-04-22 DIAGNOSIS — Z32.00 ENCOUNTER FOR PREGNANCY TEST, RESULT UNKNOWN: ICD-10-CM

## 2025-04-22 DIAGNOSIS — Z30.430 ENCOUNTER FOR INSERTION OF INTRAUTERINE CONTRACEPTIVE DEVICE: Primary | ICD-10-CM

## 2025-04-22 LAB — HCG UR QL: NEGATIVE

## 2025-04-22 PROCEDURE — 3078F DIAST BP <80 MM HG: CPT | Performed by: OBSTETRICS & GYNECOLOGY

## 2025-04-22 PROCEDURE — 58300 INSERT INTRAUTERINE DEVICE: CPT | Performed by: OBSTETRICS & GYNECOLOGY

## 2025-04-22 PROCEDURE — 81025 URINE PREGNANCY TEST: CPT | Performed by: OBSTETRICS & GYNECOLOGY

## 2025-04-22 PROCEDURE — 3074F SYST BP LT 130 MM HG: CPT | Performed by: OBSTETRICS & GYNECOLOGY

## 2025-04-22 NOTE — PATIENT INSTRUCTIONS
After having an IUD placed it is normal to have spotting and cramping, you can take Ibuprofen or Tylenol according to the instructions on the bottle and use a heating pad to the lower abdomen as needed.     Please avoid placing anything in the vagina (tampons, intercourse, etc) for 72 hours. The progesterone IUD takes 7 days to be effective for pregnancy prevention so please use condoms for back up contraception if you have intercourse before the 7 day luther but the copper (ParaGard IUD) is effective the same day of placement.     Please call [phone] with any severe abdominal pain, heavy vaginal bleeding, fevers or foul smelling vaginal discharge.     You should check your IUD strings in 2 weeks by placing one or two fingers inside the vagina as high up as you can reach, you should be able to feel the IUD strings (which feel like fishing line), if you can't feel your strings or don't feel comfortable checking yourself you can call clinic to schedule an IUD check.

## 2025-04-22 NOTE — PROGRESS NOTES
"IUD Insertion:  CONSULT:    Is a pregnancy test required: Yes.  Was it positive or negative?  Negative  Was a consent obtained?  Yes    Subjective: Dominique June is a 25 year old  presents for IUD and desires Kyleena type IUD.  She had an allergic reaction to depo provera, but has used both kyleena and mirena IUD without reaction in the past.    Patient has been given the opportunity to ask questions about all forms of birth control, including all options appropriate for Dominique June. Discussed that no method of birth control, except abstinence is 100% effective against pregnancy or sexually transmitted infection.     Dominique June understands she may have the IUD removed at any time. IUD should be removed by a health care provider.    The entire insertion procedure was reviewed with the patient, including care after placement.    Patient's last menstrual period was 2025 (exact date). Has current contraception. No allergy to betadine or shellfish. Patient declines STD screening  hCG Urine Qualitative   Date Value Ref Range Status   2025 Negative Negative Final     Comment:     This test is for screening purposes.  Results should be interpreted along with the clinical picture.  Confirmation testing is available if warranted by ordering [id number], HCG Quantitative Pregnancy.         /67   Pulse 80   Temp 97.4  F (36.3  C)   Ht 1.626 m (5' 4\")   Wt 62.7 kg (138 lb 4.8 oz)   LMP 2025 (Exact Date)   SpO2 100%   BMI 23.74 kg/m      Pelvic Exam:   EG/BUS: normal genital architecture without lesions, erythema or abnormal secretions.   Vagina: moist, pink, rugae with physiologic discharge and secretions  Cervix: parous no lesions and pink, moist, closed, without lesion or CMT  Uterus: midposition, mobile, no pain  Adnexa: within normal limits and no masses, nodularity, tenderness    PROCEDURE NOTE: -- IUD Insertion    Reason for Insertion: contraception    Premedicated with " ibuprofen.  Under sterile technique, cervix was visualized with speculum and prepped with Betadine solution swab x 3. Tenaculum was placed for stability. The uterus was gently straightened and sounded to 6.5 cm. IUD prepared for placement, and IUD inserted according to 's instructions without difficulty or significant resitance, and deployed at the fundus. The strings were visualized and trimmed to 3.0 cm from the external os. Tenaculum was removed and hemostasis noted. Speculum removed.  Patient tolerated procedure well.    EBL: minimal    Complications: none    ASSESSMENT:     ICD-10-CM    1. Encounter for pregnancy test  Z32.00 HCG qualitative urine      2. Encounter for insertion of intrauterine contraceptive device  Z30.430              PLAN:    Given 's handouts, including when to have IUD removed, list of danger s/sx, side effects and follow up recommended. Encouraged condom use for prevention of STD. Back up contraception advised for 7 days if progestin method. Advised to call for any fever, for prolonged or severe pain or bleeding, abnormal vaginal discharge, or unable to palpate strings. She was advised to use pain medications (ibuprofen) as needed for mild to moderate pain. Advised to follow-up in clinic in 4-6 weeks for IUD string check if unable to find strings or as directed by provider.     Jeanette Lr MD